# Patient Record
Sex: MALE | Race: ASIAN | NOT HISPANIC OR LATINO | URBAN - METROPOLITAN AREA
[De-identification: names, ages, dates, MRNs, and addresses within clinical notes are randomized per-mention and may not be internally consistent; named-entity substitution may affect disease eponyms.]

---

## 2017-01-10 ENCOUNTER — AMBULATORY - HEALTHEAST (OUTPATIENT)
Dept: PULMONOLOGY | Facility: OTHER | Age: 67
End: 2017-01-10

## 2017-01-10 DIAGNOSIS — J18.9 PNEUMONIA: ICD-10-CM

## 2017-01-13 ENCOUNTER — HOME CARE/HOSPICE - HEALTHEAST (OUTPATIENT)
Dept: HOME HEALTH SERVICES | Facility: HOME HEALTH | Age: 67
End: 2017-01-13

## 2017-01-27 ENCOUNTER — OFFICE VISIT - HEALTHEAST (OUTPATIENT)
Dept: CARDIOLOGY | Facility: CLINIC | Age: 67
End: 2017-01-27

## 2017-01-27 DIAGNOSIS — I49.3 ASYMPTOMATIC PVCS: ICD-10-CM

## 2017-01-27 DIAGNOSIS — I51.7 ENLARGED RV (RIGHT VENTRICLE): ICD-10-CM

## 2017-01-27 DIAGNOSIS — J44.9 COPD (CHRONIC OBSTRUCTIVE PULMONARY DISEASE) (H): ICD-10-CM

## 2017-01-27 ASSESSMENT — MIFFLIN-ST. JEOR: SCORE: 1448.46

## 2017-01-30 ENCOUNTER — HOSPITAL ENCOUNTER (OUTPATIENT)
Dept: CARDIOLOGY | Facility: CLINIC | Age: 67
Discharge: HOME | End: 2017-01-30
Attending: INTERNAL MEDICINE

## 2017-01-30 DIAGNOSIS — I49.3 ASYMPTOMATIC PVCS: ICD-10-CM

## 2017-01-30 DIAGNOSIS — I51.7 ENLARGED RV (RIGHT VENTRICLE): ICD-10-CM

## 2017-02-03 ENCOUNTER — HOME CARE/HOSPICE - HEALTHEAST (OUTPATIENT)
Dept: HOME HEALTH SERVICES | Facility: HOME HEALTH | Age: 67
End: 2017-02-03

## 2017-02-07 ENCOUNTER — HOME CARE/HOSPICE - HEALTHEAST (OUTPATIENT)
Dept: HOME HEALTH SERVICES | Facility: HOME HEALTH | Age: 67
End: 2017-02-07

## 2017-02-09 ENCOUNTER — HOSPITAL ENCOUNTER (OUTPATIENT)
Dept: CARDIOLOGY | Facility: CLINIC | Age: 67
Discharge: HOME | End: 2017-02-09
Attending: INTERNAL MEDICINE

## 2017-02-09 DIAGNOSIS — I49.3 ASYMPTOMATIC PVCS: ICD-10-CM

## 2017-02-09 DIAGNOSIS — I51.7 ENLARGED RV (RIGHT VENTRICLE): ICD-10-CM

## 2017-02-09 LAB
AORTIC ROOT: 3.6 CM
AORTIC ROOT: 3.6 CM
BSA FOR ECHO PROCEDURE: 1.71 M2
CV BLOOD PRESSURE: NORMAL MMHG
CV ECHO HEIGHT: 63 IN
CV ECHO WEIGHT: 146 LBS
ECHO EJECTION FRACTION ESTIMATED: 60 %
FRACTIONAL SHORTENING: 48.7 % (ref 28–44)
INTERVENTRICULAR SEPTUM IN END DIASTOLE: 1.16 CM (ref 0.6–1)
IVS/PW RATIO: 1
LA AREA 1: 13.1 CM2
LEFT ATRIUM LENGTH: 5.01 CM
LEFT VENTRICLE HEART RATE: 88 BPM
LEFT VENTRICLE MASS INDEX: 123.5 G/M2
LEFT VENTRICULAR INTERNAL DIMENSION IN DIASTOLE: 4.87 CM (ref 4.2–5.8)
LEFT VENTRICULAR INTERNAL DIMENSION IN SYSTOLE: 2.5 CM (ref 2.5–4)
LEFT VENTRICULAR MASS: 211.2 G
LEFT VENTRICULAR POSTERIOR WALL IN END DIASTOLE: 1.14 CM (ref 0.6–1)
NUC REST DIASTOLIC VOLUME INDEX: 2336 LBS
NUC REST SYSTOLIC VOLUME INDEX: 63 IN
TRICUSPID VALVE ANULAR PLANE SYSTOLIC EXCURSION: 1.9 CM
TRICUSPID VALVE PEAK REGURGITANT VELOCITY: 221 CM/S
TRICUSPID VALVE PEAK REGURGITANT VELOCITY: 238 CM/S
TRICUSPID VALVE PEAK REGURGITANT VELOCITY: 251 CM/S

## 2017-02-09 ASSESSMENT — MIFFLIN-ST. JEOR: SCORE: 1312.38

## 2017-02-10 ENCOUNTER — HOME CARE/HOSPICE - HEALTHEAST (OUTPATIENT)
Dept: HOME HEALTH SERVICES | Facility: HOME HEALTH | Age: 67
End: 2017-02-10

## 2017-02-13 ENCOUNTER — OFFICE VISIT - HEALTHEAST (OUTPATIENT)
Dept: CARDIOLOGY | Facility: CLINIC | Age: 67
End: 2017-02-13

## 2017-02-13 ENCOUNTER — AMBULATORY - HEALTHEAST (OUTPATIENT)
Dept: CARDIOLOGY | Facility: CLINIC | Age: 67
End: 2017-02-13

## 2017-02-13 DIAGNOSIS — I51.89 RIGHT VENTRICULAR SYSTOLIC DYSFUNCTION: ICD-10-CM

## 2017-02-13 ASSESSMENT — MIFFLIN-ST. JEOR: SCORE: 1380.42

## 2017-02-14 ENCOUNTER — HOME CARE/HOSPICE - HEALTHEAST (OUTPATIENT)
Dept: HOME HEALTH SERVICES | Facility: HOME HEALTH | Age: 67
End: 2017-02-14

## 2017-02-17 ENCOUNTER — HOME CARE/HOSPICE - HEALTHEAST (OUTPATIENT)
Dept: HOME HEALTH SERVICES | Facility: HOME HEALTH | Age: 67
End: 2017-02-17

## 2017-02-20 ENCOUNTER — HOME CARE/HOSPICE - HEALTHEAST (OUTPATIENT)
Dept: HOME HEALTH SERVICES | Facility: HOME HEALTH | Age: 67
End: 2017-02-20

## 2017-02-22 ENCOUNTER — HOME CARE/HOSPICE - HEALTHEAST (OUTPATIENT)
Dept: HOME HEALTH SERVICES | Facility: HOME HEALTH | Age: 67
End: 2017-02-22

## 2017-02-23 ENCOUNTER — HOME CARE/HOSPICE - HEALTHEAST (OUTPATIENT)
Dept: HOME HEALTH SERVICES | Facility: HOME HEALTH | Age: 67
End: 2017-02-23

## 2017-02-25 ENCOUNTER — HOME CARE/HOSPICE - HEALTHEAST (OUTPATIENT)
Dept: HOME HEALTH SERVICES | Facility: HOME HEALTH | Age: 67
End: 2017-02-25

## 2017-02-27 ENCOUNTER — HOSPITAL ENCOUNTER (OUTPATIENT)
Dept: RADIOLOGY | Facility: HOSPITAL | Age: 67
Discharge: HOME | End: 2017-02-27
Attending: INTERNAL MEDICINE

## 2017-02-27 ENCOUNTER — COMMUNICATION - HEALTHEAST (OUTPATIENT)
Dept: PHYSICAL MEDICINE AND REHAB | Facility: CLINIC | Age: 67
End: 2017-02-27

## 2017-02-27 ENCOUNTER — OFFICE VISIT - HEALTHEAST (OUTPATIENT)
Dept: PULMONOLOGY | Facility: OTHER | Age: 67
End: 2017-02-27

## 2017-02-27 DIAGNOSIS — G47.30 SLEEP-DISORDERED BREATHING: ICD-10-CM

## 2017-02-27 DIAGNOSIS — R06.09 DYSPNEA ON EXERTION: ICD-10-CM

## 2017-02-27 DIAGNOSIS — J18.9 PNEUMONIA: ICD-10-CM

## 2017-02-27 DIAGNOSIS — J96.11 CHRONIC RESPIRATORY FAILURE WITH HYPOXIA (H): ICD-10-CM

## 2017-02-27 DIAGNOSIS — J44.89 CHRONIC AIRWAY OBSTRUCTION, NOT ELSEWHERE CLASSIFIED: ICD-10-CM

## 2017-02-28 ENCOUNTER — HOME CARE/HOSPICE - HEALTHEAST (OUTPATIENT)
Dept: HOME HEALTH SERVICES | Facility: HOME HEALTH | Age: 67
End: 2017-02-28

## 2017-03-01 ENCOUNTER — HOME CARE/HOSPICE - HEALTHEAST (OUTPATIENT)
Dept: HOME HEALTH SERVICES | Facility: HOME HEALTH | Age: 67
End: 2017-03-01

## 2017-03-02 ENCOUNTER — RECORDS - HEALTHEAST (OUTPATIENT)
Dept: ADMINISTRATIVE | Facility: OTHER | Age: 67
End: 2017-03-02

## 2017-03-02 ENCOUNTER — HOME CARE/HOSPICE - HEALTHEAST (OUTPATIENT)
Dept: HOME HEALTH SERVICES | Facility: HOME HEALTH | Age: 67
End: 2017-03-02

## 2017-03-03 ENCOUNTER — HOME CARE/HOSPICE - HEALTHEAST (OUTPATIENT)
Dept: HOME HEALTH SERVICES | Facility: HOME HEALTH | Age: 67
End: 2017-03-03

## 2017-03-06 ENCOUNTER — HOME CARE/HOSPICE - HEALTHEAST (OUTPATIENT)
Dept: HOME HEALTH SERVICES | Facility: HOME HEALTH | Age: 67
End: 2017-03-06

## 2017-03-08 ENCOUNTER — HOME CARE/HOSPICE - HEALTHEAST (OUTPATIENT)
Dept: HOME HEALTH SERVICES | Facility: HOME HEALTH | Age: 67
End: 2017-03-08

## 2017-03-09 ENCOUNTER — AMBULATORY - HEALTHEAST (OUTPATIENT)
Dept: CARDIOLOGY | Facility: CLINIC | Age: 67
End: 2017-03-09

## 2017-03-09 ENCOUNTER — OFFICE VISIT - HEALTHEAST (OUTPATIENT)
Dept: CARDIOLOGY | Facility: CLINIC | Age: 67
End: 2017-03-09

## 2017-03-09 ENCOUNTER — HOSPITAL ENCOUNTER (OUTPATIENT)
Dept: PHYSICAL MEDICINE AND REHAB | Facility: CLINIC | Age: 67
Discharge: HOME | End: 2017-03-09
Attending: PHYSICIAN ASSISTANT

## 2017-03-09 DIAGNOSIS — J44.9 COPD (CHRONIC OBSTRUCTIVE PULMONARY DISEASE) (H): ICD-10-CM

## 2017-03-09 DIAGNOSIS — M81.8 STEROID-INDUCED OSTEOPOROSIS: ICD-10-CM

## 2017-03-09 DIAGNOSIS — T38.0X5A STEROID-INDUCED OSTEOPOROSIS: ICD-10-CM

## 2017-03-09 DIAGNOSIS — M54.50 LOW BACK PAIN: ICD-10-CM

## 2017-03-09 DIAGNOSIS — S22.000A THORACIC COMPRESSION FRACTURE (H): ICD-10-CM

## 2017-03-09 DIAGNOSIS — I50.810 RIGHT HEART FAILURE (H): ICD-10-CM

## 2017-03-09 ASSESSMENT — MIFFLIN-ST. JEOR
SCORE: 1362.28
SCORE: 1362.28

## 2017-03-13 ENCOUNTER — HOME CARE/HOSPICE - HEALTHEAST (OUTPATIENT)
Dept: HOME HEALTH SERVICES | Facility: HOME HEALTH | Age: 67
End: 2017-03-13

## 2017-03-15 ENCOUNTER — HOSPITAL ENCOUNTER (OUTPATIENT)
Dept: MRI IMAGING | Facility: CLINIC | Age: 67
Discharge: HOME | End: 2017-03-15
Attending: PHYSICIAN ASSISTANT

## 2017-03-15 DIAGNOSIS — S22.000A THORACIC COMPRESSION FRACTURE (H): ICD-10-CM

## 2017-03-15 DIAGNOSIS — M54.50 LOW BACK PAIN: ICD-10-CM

## 2017-03-16 ENCOUNTER — HOME CARE/HOSPICE - HEALTHEAST (OUTPATIENT)
Dept: HOME HEALTH SERVICES | Facility: HOME HEALTH | Age: 67
End: 2017-03-16

## 2017-03-20 ENCOUNTER — HOSPITAL ENCOUNTER (OUTPATIENT)
Dept: PHYSICAL MEDICINE AND REHAB | Facility: CLINIC | Age: 67
Discharge: HOME | End: 2017-03-20
Attending: PHYSICIAN ASSISTANT

## 2017-03-20 DIAGNOSIS — T38.0X5A STEROID-INDUCED OSTEOPOROSIS: ICD-10-CM

## 2017-03-20 DIAGNOSIS — M54.50 LOW BACK PAIN: ICD-10-CM

## 2017-03-20 DIAGNOSIS — S22.000A THORACIC COMPRESSION FRACTURE (H): ICD-10-CM

## 2017-03-20 DIAGNOSIS — M81.8 STEROID-INDUCED OSTEOPOROSIS: ICD-10-CM

## 2017-03-21 ENCOUNTER — AMBULATORY - HEALTHEAST (OUTPATIENT)
Dept: NEUROSURGERY | Facility: CLINIC | Age: 67
End: 2017-03-21

## 2017-03-21 DIAGNOSIS — M54.9 BACK PAIN: ICD-10-CM

## 2017-03-29 ENCOUNTER — AMBULATORY - HEALTHEAST (OUTPATIENT)
Dept: NEUROSURGERY | Facility: CLINIC | Age: 67
End: 2017-03-29

## 2017-03-29 ENCOUNTER — OFFICE VISIT - HEALTHEAST (OUTPATIENT)
Dept: NEUROSURGERY | Facility: CLINIC | Age: 67
End: 2017-03-29

## 2017-03-29 DIAGNOSIS — M53.3 SACROILIAC JOINT DYSFUNCTION OF BOTH SIDES: ICD-10-CM

## 2017-03-29 DIAGNOSIS — E55.9 VITAMIN D DEFICIENCY: ICD-10-CM

## 2017-03-29 DIAGNOSIS — T38.0X5S STEROID SIDE EFFECTS, SEQUELA: ICD-10-CM

## 2017-03-29 DIAGNOSIS — M48.54XS COMPRESSION FRACTURE OF THORACIC SPINE, NON-TRAUMATIC, SEQUELA: ICD-10-CM

## 2017-03-29 DIAGNOSIS — M81.0 OSTEOPOROSIS: ICD-10-CM

## 2017-03-29 ASSESSMENT — MIFFLIN-ST. JEOR: SCORE: 1338.46

## 2017-04-14 ENCOUNTER — OFFICE VISIT - HEALTHEAST (OUTPATIENT)
Dept: PULMONOLOGY | Facility: OTHER | Age: 67
End: 2017-04-14

## 2017-04-14 DIAGNOSIS — J96.11 CHRONIC RESPIRATORY FAILURE WITH HYPOXIA (H): ICD-10-CM

## 2017-04-14 DIAGNOSIS — R06.09 DYSPNEA ON EXERTION: ICD-10-CM

## 2017-04-14 DIAGNOSIS — J41.0 SIMPLE CHRONIC BRONCHITIS (H): ICD-10-CM

## 2017-07-24 ENCOUNTER — COMMUNICATION - HEALTHEAST (OUTPATIENT)
Dept: PULMONOLOGY | Facility: OTHER | Age: 67
End: 2017-07-24

## 2017-07-24 DIAGNOSIS — K21.9 GERD (GASTROESOPHAGEAL REFLUX DISEASE): ICD-10-CM

## 2018-01-01 ENCOUNTER — HOME CARE/HOSPICE - HEALTHEAST (OUTPATIENT)
Dept: HOSPICE | Facility: HOSPICE | Age: 68
End: 2018-01-01

## 2018-01-01 ENCOUNTER — RECORDS - HEALTHEAST (OUTPATIENT)
Dept: INTENSIVE CARE | Facility: HOSPITAL | Age: 68
End: 2018-01-01

## 2018-01-01 ENCOUNTER — COMMUNICATION - HEALTHEAST (OUTPATIENT)
Dept: ADMINISTRATIVE | Facility: CLINIC | Age: 68
End: 2018-01-01

## 2018-01-01 ENCOUNTER — COMMUNICATION - HEALTHEAST (OUTPATIENT)
Dept: PULMONOLOGY | Facility: OTHER | Age: 68
End: 2018-01-01

## 2018-01-01 ENCOUNTER — RECORDS - HEALTHEAST (OUTPATIENT)
Dept: ADMINISTRATIVE | Facility: OTHER | Age: 68
End: 2018-01-01

## 2018-01-01 ENCOUNTER — ANESTHESIA - HEALTHEAST (OUTPATIENT)
Dept: INTENSIVE CARE | Facility: HOSPITAL | Age: 68
End: 2018-01-01

## 2018-01-01 ENCOUNTER — OFFICE VISIT - HEALTHEAST (OUTPATIENT)
Dept: SURGERY | Facility: CLINIC | Age: 68
End: 2018-01-01

## 2018-01-01 ENCOUNTER — SURGERY - HEALTHEAST (OUTPATIENT)
Dept: SURGERY | Facility: CLINIC | Age: 68
End: 2018-01-01

## 2018-01-01 ENCOUNTER — HOSPITAL ENCOUNTER (OUTPATIENT)
Dept: CT IMAGING | Facility: CLINIC | Age: 68
Discharge: HOME | End: 2018-02-20

## 2018-01-01 ENCOUNTER — AMBULATORY - HEALTHEAST (OUTPATIENT)
Dept: SURGERY | Facility: CLINIC | Age: 68
End: 2018-01-01

## 2018-01-01 ENCOUNTER — ANESTHESIA - HEALTHEAST (OUTPATIENT)
Dept: SURGERY | Facility: CLINIC | Age: 68
End: 2018-01-01

## 2018-01-01 ENCOUNTER — AMBULATORY - HEALTHEAST (OUTPATIENT)
Dept: HOSPICE | Facility: HOSPICE | Age: 68
End: 2018-01-01

## 2018-01-01 ENCOUNTER — HOME CARE/HOSPICE - HEALTHEAST (OUTPATIENT)
Dept: HOME HEALTH SERVICES | Facility: HOME HEALTH | Age: 68
End: 2018-01-01

## 2018-01-01 ENCOUNTER — AMBULATORY - HEALTHEAST (OUTPATIENT)
Dept: PULMONOLOGY | Facility: OTHER | Age: 68
End: 2018-01-01

## 2018-01-01 DIAGNOSIS — J44.9 COPD (CHRONIC OBSTRUCTIVE PULMONARY DISEASE) (H): ICD-10-CM

## 2018-01-01 DIAGNOSIS — Z90.49 S/P CHOLECYSTECTOMY: ICD-10-CM

## 2018-01-01 DIAGNOSIS — R10.9 ABDOMINAL PAIN: ICD-10-CM

## 2018-01-01 DIAGNOSIS — J41.0 SIMPLE CHRONIC BRONCHITIS (H): ICD-10-CM

## 2018-01-01 DIAGNOSIS — J43.9 PULMONARY EMPHYSEMA, UNSPECIFIED EMPHYSEMA TYPE (H): ICD-10-CM

## 2018-01-01 DIAGNOSIS — K81.9 CHOLECYSTITIS: ICD-10-CM

## 2018-01-01 RX ORDER — MORPHINE SULFATE 100 MG/5ML
10-20 SOLUTION ORAL EVERY 4 HOURS
Status: SHIPPED | COMMUNITY
Start: 2018-01-01

## 2018-01-01 RX ORDER — BISACODYL 10 MG
10 SUPPOSITORY, RECTAL RECTAL DAILY PRN
Status: SHIPPED | COMMUNITY
Start: 2018-01-01

## 2018-01-01 RX ORDER — LORAZEPAM 2 MG/ML
1 CONCENTRATE ORAL EVERY 4 HOURS PRN
Status: SHIPPED | COMMUNITY
Start: 2018-01-01

## 2018-01-01 ASSESSMENT — MIFFLIN-ST. JEOR
SCORE: 1321.45
SCORE: 1407.64
SCORE: 1323.27
SCORE: 1308.3
SCORE: 1310.11
SCORE: 1310.11
SCORE: 1308.3
SCORE: 1323.27
SCORE: 1407.64

## 2018-12-07 ENCOUNTER — HOME CARE/HOSPICE - HEALTHEAST (OUTPATIENT)
Dept: HOSPICE | Facility: HOSPICE | Age: 68
End: 2018-12-07

## 2021-05-30 VITALS — BODY MASS INDEX: 27.81 KG/M2 | WEIGHT: 157 LBS

## 2021-05-30 VITALS — BODY MASS INDEX: 28.89 KG/M2 | WEIGHT: 157 LBS | HEIGHT: 62 IN

## 2021-05-30 VITALS — BODY MASS INDEX: 27.82 KG/M2 | HEIGHT: 63 IN | WEIGHT: 157 LBS

## 2021-05-30 VITALS — WEIGHT: 159.2 LBS | BODY MASS INDEX: 29.59 KG/M2

## 2021-05-30 VITALS — WEIGHT: 161 LBS | BODY MASS INDEX: 28.53 KG/M2 | HEIGHT: 63 IN

## 2021-05-30 VITALS — BODY MASS INDEX: 27.28 KG/M2 | WEIGHT: 154 LBS

## 2021-05-30 VITALS — BODY MASS INDEX: 28.75 KG/M2 | WEIGHT: 162.3 LBS

## 2021-05-30 VITALS — HEIGHT: 63 IN | WEIGHT: 146 LBS | BODY MASS INDEX: 25.87 KG/M2

## 2021-05-30 VITALS — BODY MASS INDEX: 28.16 KG/M2 | HEIGHT: 65 IN | WEIGHT: 169 LBS

## 2021-05-30 VITALS — WEIGHT: 156.2 LBS | BODY MASS INDEX: 27.67 KG/M2

## 2021-06-01 VITALS — BODY MASS INDEX: 26.22 KG/M2 | HEIGHT: 63 IN | WEIGHT: 148 LBS

## 2021-06-01 VITALS
WEIGHT: 148.4 LBS | BODY MASS INDEX: 26.29 KG/M2 | HEIGHT: 63 IN | WEIGHT: 148.4 LBS | HEIGHT: 63 IN | BODY MASS INDEX: 26.29 KG/M2

## 2021-06-02 ENCOUNTER — RECORDS - HEALTHEAST (OUTPATIENT)
Dept: ADMINISTRATIVE | Facility: CLINIC | Age: 71
End: 2021-06-02

## 2021-06-08 NOTE — PROGRESS NOTES
"[facility] Heart Care Note    Assessment/Plan:    Qing Lira is a 66 y.o. old male with:  1. RV dysfunction in setting of pneumonia and COPD - no signs of pulmonary HTN or right heart failure other than mild edema  - will repeat echo and follow up with heart failure NP - if RV is still depressed, would arrange follow up with cardiologist who specializes in heart failure  2. Possible arrythmia - could not find tests to confirm, ECG NSR and no PVC, will schedule 24 hour holter to screen for PVC/nonsustained VT -         Dr. Titi Mesa  [facility] HEART CARE  [phone]  ______________________________________________________________________    Subjective:    I had the opportunity to see Qing Lira at the [facility]. Qing Lira is a 66 y.o. male hmong speaking only with a known history of severe COPD, oxygen dependent, with RV dysfunction and normal LV function, TR estimate of PA pressure 23mmHg + RAP,  here with complaint of dyspnea.   Dyspnea since 2013, recent admission for COPD exhaserbatin and pneumonia and was told to follow up to \"take a look at the heart\".  There is mention of nonsustained VT during hospital stay noted, but family unaware.  He does not have a hx of MI, no syncopal events, no CVA, no family hx of sudden death, no hx of  DM, HTN or hyperlipidemia.   Pt reports increase dyspnea, he is unable to ambulate far due to dyspnea.   He has had some edema in the past, using furosemide.  Tob in the past, but stopped when he was sick ~1 year ago.  No  symptoms of right heart failure other than mild edema.      ______________________________________________________________________      Review of Systems:   General: Night Sweats  Eyes: WNL  Ears/Nose/Throat: WNL  Lungs: Cough, Shortness of Breath, Wheezing  Heart: Shortness of Breath with activity, Leg Swelling  Stomach: WNL  Bladder: Frequent Urination at Night  Muscle/Joints: WNL  Skin: WNL  Nervous System: Daytime " Sleepiness, Dizziness  Mental Health: WNL     Blood: WNL    Problem List:  Patient Active Problem List   Diagnosis     Cough     Abdominal Pain     Chronic Obstructive Pulmonary Disease     Tobacco abuse     Chest pain     Hypoxia     Constipation     Hyperglycemia     COPD (chronic obstructive pulmonary disease)     Back pain     Compression fracture of lumbar vertebra     COPD exacerbation     Acute respiratory failure     Diastolic dysfunction     Pneumonia     Medical History:  Past Medical History   Diagnosis Date     Asthma      COPD (chronic obstructive pulmonary disease)      Headache      Hyperglycemia      Hypertension      Pneumonia      Surgical History:  Past Surgical History   Procedure Laterality Date     No past surgeries       Picc and midline team line insertion  1/7/2017           Social History:  No pertinent social hx related to patient's chief complaint other than above in subjective        Family History:  No pertinent family hx related to patient's chief complaint other than above in subjective      Allergies:  No Known Allergies    Medications:  Current Outpatient Prescriptions   Medication Sig Dispense Refill     acetaminophen (MAPAP, ACETAMINOPHEN,) 500 mg coapsule Take 500 mg by mouth every 6 (six) hours as needed.        albuterol (PROVENTIL HFA;VENTOLIN HFA) 90 mcg/actuation inhaler Inhale 1-2 puffs every 4 (four) hours as needed.        ascorbic acid (ASCORBIC ACID WITH RUBIO HIPS) 500 MG tablet Take 1 tablet (500 mg total) by mouth daily. 90 tablet 1     budesonide-formoterol (SYMBICORT) 80-4.5 mcg/actuation inhaler Inhale 2 puffs 2 (two) times a day. 1 Inhaler 11     furosemide (LASIX) 20 MG tablet Take 1 tablet (20 mg total) by mouth 2 (two) times a day at 9am and 6pm. 60 tablet 0     hydrocortisone (CORTEF) 5 MG tablet Take 5 mg by mouth daily with supper. Take 2 tab in the am and 1 tab in the pm        hydrocortisone (CORTEF) 5 MG tablet Take 10 mg by mouth every morning.        "hydrocortisone 1 % cream Apply 1 application topically 2 (two) times a day as needed (itching).       ipratropium-albuterol (COMBIVENT RESPIMAT)  mcg/actuation Mist inhaler Inhale 1 puff 4 (four) times a day as needed.        ipratropium-albuterol (DUO-NEB) 0.5-2.5 mg/3 mL nebulizer Use qid for 5 day then 2-3 times a day as needed 75 mL 11     loratadine (CLARITIN) 10 mg tablet Take 10 mg by mouth daily as needed for allergies.        magnesium oxide 400 mg cap Take 1 tablet by mouth daily.       NEBULIZER ACCESSORIES (NEBULIZER MISC) Use as directed.       omeprazole (PRILOSEC) 20 MG capsule Take 1 capsule (20 mg total) by mouth daily. 30 capsule 11     psyllium (FIBER LAXATIVE, PSYLLIUM HUSK,) 0.52 gram capsule Take 0.52 g by mouth 4 (four) times a day as needed. May take 1-4 tabs daily as needed        senna-docusate (PERICOLACE) 8.6-50 mg tablet Take 1 tablet by mouth daily as needed for constipation.       No current facility-administered medications for this visit.        Objective:   Vital signs:  Visit Vitals     /88 (Patient Site: Left Arm, Patient Position: Sitting, Cuff Size: Adult Large)     Pulse 96     Resp 18     Ht 5' 5\" (1.651 m)     Wt 169 lb (76.7 kg)     BMI 28.12 kg/m2         Physical Exam:    GENERAL APPEARANCE: Alert, cooperative and in no acute distress.  HEENT: No scleral icterus. No Xanthelasma. Oral mucuos membranes pink and moist.  NECK: JVP<6cm. No Hepatojugular reflux. Thyroid not visualized  CHEST: clear to auscultation  CARDIOVASCULAR: S1, S2 without murmur ,clicks or rubs. Brachial, radial and posterior tibial pulses are intact and symetric. No carotid bruits noted.    ABDOMEN: Nontender. BS+. No bruits.  EXTREMITIES: No cyanosis, clubbing or edema.    Lab Results:  LIPIDS:  No results found for: CHOL  No results found for: HDL  No results found for: LDLCALC  No results found for: TRIG  No components found for: CHOLHDL    BMP:  Lab Results   Component Value Date    " CREATININE 0.73 01/12/2017    BUN 31 (H) 01/12/2017     01/12/2017    K 3.9 01/12/2017    CL 96 (L) 01/12/2017    CO2 38 (H) 01/12/2017       Left ventricle ejection fraction is normal. The calculated left ventricular ejection fraction is 71%.    Right Ventricle: The right ventricle is moderately dilated. Right ventricular end-diastolic volume is moderately increased. Right ventricular end-systolic volume is moderately increased. The systolic function is severely reduced. TAPSE is abnormal, which is consistent with abnormal right ventricular systolic function.    No previous study for comparison.    Titi Mesa MD  [facility] HEART [facility]  [phone]

## 2021-06-08 NOTE — PROGRESS NOTES
Assessment/Plan:     1. Right-sided heart failure: Qing Lira appears well compensated.  We discussed monitoring heart failure symptoms, following a low-sodium diet, monitoring daily weights, and heart failure treatment.  He is on Lasix 20 mg twice a day.  He was very appreciative for the education on low sodium diet and monitoring daily weights to help improve fluid retention.  We discussed the results of his limited echocardiogram.    Follow-up in the heart failure clinic as needed    Subjective:     Qing Lira is seen at [facility] heart failure clinic today for continued follow-up.  His son accompanies him today.  There is an  for the duration of his appointment.  He follows up for right sided heart failure.  He had an echocardiogram in January 7, 2017 which showed an ejection fraction of 71% with his right ventricle moderately dilated.  He saw Dr. Mesa in the heart clinic who recommended a repeat echocardiogram due to the thought his right sided heart failure was due to his COPD.  On February 9, 2017 he had a limited echocardiogram which showed an improvement in his right ventricular function.  He has past medical history significant for hypertension, COPD, asthma, and previous tobacco use.    Today, he comes in with complaints of dyspnea on exertion which has not worsened.  He denies any other acute heart failure symptoms.  He denies fatigue, lightheadedness, shortness of breath, orthopnea, PND, palpitations, chest pain, abdominal fullness/bloating and lower extremity edema.      He is not monitoring home weights.  He is not following a low sodium diet.      Review of Systems:   General: WNL  Eyes: WNL  Ears/Nose/Throat: WNL  Lungs: Cough, Shortness of Breath  Heart: Shortness of Breath with activity  Stomach: WNL  Bladder: WNL  Muscle/Joints: WNL  Skin: WNL  Nervous System: WNL  Mental Health: WNL     Blood: WNL     Patient Active Problem List   Diagnosis     Cough     Abdominal  Pain     Chronic Obstructive Pulmonary Disease     Tobacco abuse     Chest pain     Hypoxia     Constipation     Hyperglycemia     COPD (chronic obstructive pulmonary disease)     Back pain     Compression fracture of lumbar vertebra     COPD exacerbation     Acute respiratory failure     Pneumonia     Narcotic overdose, accidental or unintentional, subsequent encounter     Acute hypoxemic respiratory failure     Right ventricular systolic dysfunction     Delirium       Past Medical History:   Diagnosis Date     Asthma      COPD (chronic obstructive pulmonary disease)     oxygen dep 3 liters     Headache      Hyperglycemia      Hypertension      Pneumonia      Secondary adrenal insufficiency        Past Surgical History:   Procedure Laterality Date     INSERT MIDLINE HE  2/1/2017          NO PAST SURGERIES       PICC AND MIDLINE TEAM LINE INSERTION  1/7/2017            Family History   Problem Relation Age of Onset     Cancer Mother      Ovarian     No Medical Problems Father      Clotting disorder Neg Hx        Social History     Social History     Marital status:      Spouse name: N/A     Number of children: N/A     Years of education: N/A     Occupational History     Not on file.     Social History Main Topics     Smoking status: Former Smoker     Packs/day: 1.00     Years: 30.00     Quit date: 3/1/2015     Smokeless tobacco: Never Used     Alcohol use No      Comment: Smoking cessation packet provided     Drug use: No     Sexual activity: Not on file     Other Topics Concern     Not on file     Social History Narrative       Current Outpatient Prescriptions   Medication Sig Dispense Refill     acetaminophen (MAPAP, ACETAMINOPHEN,) 500 mg coapsule Take 500 mg by mouth every 6 (six) hours. 30 capsule 0     albuterol (PROVENTIL HFA;VENTOLIN HFA) 90 mcg/actuation inhaler Inhale 1-2 puffs every 4 (four) hours as needed.        albuterol (PROVENTIL) 2.5 mg /3 mL (0.083 %) nebulizer solution Take 3 mL (2.5 mg  total) by nebulization every 4 (four) hours as needed for shortness of breath. 75 mL 0     ascorbic acid (ASCORBIC ACID WITH RUBIO HIPS) 500 MG tablet Take 1 tablet (500 mg total) by mouth daily. 90 tablet 1     budesonide-formoterol (SYMBICORT) 80-4.5 mcg/actuation inhaler Inhale 2 puffs 2 (two) times a day. 1 Inhaler 11     calcitonin, salmon, (MIACALCIN) 200 unit/actuation nasal spray 1 spray into each nostril daily. 1.85 mL 0     furosemide (LASIX) 20 MG tablet Take 1 tablet (20 mg total) by mouth 2 (two) times a day at 9am and 6pm. 60 tablet 0     HELIUM-OXYGEN INHL 3 L into each nostril continuous. Indications: SOB       hydrocortisone (CORTEF) 5 MG tablet Take 1 tablet (5 mg total) by mouth daily with supper. Start on 2/9 Take 2 tab in the am and 1 tab in the pm  0     hydrocortisone 1 % cream Apply 1 application topically 2 (two) times a day as needed (itching).       ipratropium-albuterol (COMBIVENT RESPIMAT)  mcg/actuation Mist inhaler Inhale 1 puff 4 (four) times a day as needed.        ipratropium-albuterol (DUO-NEB) 0.5-2.5 mg/3 mL nebulizer Use qid for 5 day then 2-3 times a day as needed (Patient taking differently: Inhale 3 mL every 8 (eight) hours as needed. ) 75 mL 11     lidocaine (LIDODERM) 5 % Remove & Discard patch within 12 hours or as directed by MD 30 patch 0     loratadine (CLARITIN) 10 mg tablet Take 10 mg by mouth daily as needed for allergies.        magnesium oxide 400 mg cap Take 1 tablet by mouth daily.       naproxen sodium (ALEVE) 220 MG tablet Take 1 tablet (220 mg total) by mouth 2 (two) times a day with meals.  0     NEBULIZER ACCESSORIES (NEBULIZER MISC) Use as directed.       omeprazole (PRILOSEC) 20 MG capsule Take 1 capsule (20 mg total) by mouth daily. 30 capsule 11     predniSONE (DELTASONE) 10 MG tablet Take as directed       psyllium (FIBER LAXATIVE, PSYLLIUM HUSK,) 0.52 gram capsule Take 0.52 g by mouth 4 (four) times a day as needed. May take 1-4 tabs daily as  needed        senna-docusate (PERICOLACE) 8.6-50 mg tablet Take 1 tablet by mouth daily as needed for constipation.       hydrocortisone (CORTEF) 5 MG tablet Take 2 tablets (10 mg total) by mouth every morning. Resume on 2/9  0     No current facility-administered medications for this visit.        No Known Allergies    Objective:     Vitals:    02/13/17 1013   BP: 126/82   Pulse: (!) 104   Resp: 20     Wt Readings from Last 3 Encounters:   02/13/17 161 lb (73 kg)   02/09/17 146 lb (66.2 kg)   02/06/17 146 lb 9.6 oz (66.5 kg)       General Appearance:   Alert, cooperative and in no acute distress.   HEENT:  No scleral icterus; the mucous membranes were pink and moist.   Neck: JVP normal. No HJR.   Chest: The spine was straight. The chest was symmetric.   Lungs:   Respirations unlabored; the lungs are decreased to auscultation.   Cardiovascular:   Regular rhythm. S1 and S2 without murmur, clicks or rubs. Radial and posterior tibial pulses are intact and symmetrical.    Abdomen:  Soft, nontender, nondistended, bowel sounds present   Extremities: No cyanosis, clubbing. 1+ bilateral lower extremity edema.   Skin: No xanthelasma.   Neurologic: Mood and affect are appropriate.         Lab Review   Lab Results   Component Value Date    CREATININE 0.72 02/05/2017    BUN 27 (H) 02/05/2017     02/05/2017    K 4.9 02/06/2017     02/05/2017    CO2 21 (L) 02/05/2017     Lab Results   Component Value Date     (H) 02/03/2017     BNP (pg/mL)   Date Value   02/03/2017 106 (H)   02/01/2017 163 (H)   01/07/2017 362 (H)     CREATININE (mg/dL)   Date Value   02/05/2017 0.72   02/03/2017 0.67 (L)   02/02/2017 0.65 (L)   02/01/2017 0.84       Cardiographics  Echocardiogram: 2/9/2017  Summary     No hemodynamically significant valvular heart abnormalities.    When compared to the previous study dated 1/7/2017, the right ventricular function has improved    Left ventricle ejection fraction is normal. The estimated left  ventricular ejection fraction is 60%.    Mild right ventricular enlargement           25 minutes were spent face to face with the patient with greater than 50% spent on education and counseling.      Vivien Keane, [facility]   Heart Failure Clinic

## 2021-06-09 NOTE — PROGRESS NOTES
[facility] Heart Care Clinic Progress Note    Assessment:    1.  Right heart failure improved  2.  Severe COPD clinically stable        Plan:    Would change his furosemide to 40 mg once a day and continue all of his other medicines as he is currently taking.  I have not arranged any specific follow-up or further cardiac testing for Mr. Lira at this time    An After Visit Summary was printed and given to the patient.    Subjective:    66-year-old male who has known right ventricular dysfunction secondary to severe underlying COPD.  He had an echocardiogram on January 7, 2017 that revealed normal left ventricular systolic function with no significant valvular abnormalities.  He had right ventricular enlargement present with severe right ventricular hypokinesis present.  The patient was subsequently hospitalized from February 1 2 February 6 for a COPD exacerbation and altered mental status felt to be due to narcotic overdose.  Echocardiogram on February 9, 2017 revealed improved in his right ventricular size and function.  Over the last several weeks his breathing has gradually improved he reports only a trace to mild pedal edema that has been stable    Problem List:    Patient Active Problem List   Diagnosis     Cough     Abdominal Pain     Chronic Obstructive Pulmonary Disease     Tobacco abuse     Chest pain     Constipation     Hyperglycemia     COPD (chronic obstructive pulmonary disease)     Back pain     Compression fracture of lumbar vertebra     Narcotic overdose, accidental or unintentional, subsequent encounter     Right ventricular systolic dysfunction     Delirium         Current Outpatient Prescriptions   Medication Sig Dispense Refill     acetaminophen (MAPAP, ACETAMINOPHEN,) 500 mg coapsule Take 500 mg by mouth every 6 (six) hours. 30 capsule 0     albuterol (PROVENTIL HFA;VENTOLIN HFA) 90 mcg/actuation inhaler Inhale 1-2 puffs every 4 (four) hours as needed. 1 Inhaler 11     albuterol (PROVENTIL) 2.5  mg /3 mL (0.083 %) nebulizer solution Take 3 mL (2.5 mg total) by nebulization every 4 (four) hours as needed for shortness of breath. 75 mL 0     ascorbic acid (ASCORBIC ACID WITH RUBIO HIPS) 500 MG tablet Take 1 tablet (500 mg total) by mouth daily. 90 tablet 1     budesonide-formoterol (SYMBICORT) 80-4.5 mcg/actuation inhaler Inhale 2 puffs 2 (two) times a day. 1 Inhaler 11     calcitonin, salmon, (MIACALCIN) 200 unit/actuation nasal spray 1 spray into each nostril daily. 1.85 mL 0     HELIUM-OXYGEN INHL 3 L into each nostril continuous. Indications: SOB       hydrocortisone (CORTEF) 5 MG tablet Take 1 tablet (5 mg total) by mouth daily with supper. Start on 2/9 Take 2 tab in the am and 1 tab in the pm  0     hydrocortisone 1 % cream Apply 1 application topically 2 (two) times a day as needed (itching).       ipratropium-albuterol (COMBIVENT RESPIMAT)  mcg/actuation Mist inhaler Inhale 1 puff 4 (four) times a day as needed. 1 Inhaler 11     ipratropium-albuterol (DUO-NEB) 0.5-2.5 mg/3 mL nebulizer Use qid for 5 day then 2-3 times a day as needed (Patient taking differently: Inhale 3 mL every 8 (eight) hours as needed. ) 75 mL 11     loratadine (CLARITIN) 10 mg tablet Take 10 mg by mouth daily as needed for allergies.        naproxen sodium (ALEVE) 220 MG tablet Take 1 tablet (220 mg total) by mouth 2 (two) times a day with meals.  0     NEBULIZER ACCESSORIES (NEBULIZER MISC) Use as directed.       omeprazole (PRILOSEC) 20 MG capsule Take 1 capsule (20 mg total) by mouth daily. 30 capsule 11     predniSONE (DELTASONE) 10 MG tablet Take as directed       psyllium (FIBER LAXATIVE, PSYLLIUM HUSK,) 0.52 gram capsule Take 0.52 g by mouth 4 (four) times a day as needed. May take 1-4 tabs daily as needed        senna-docusate (PERICOLACE) 8.6-50 mg tablet Take 1 tablet by mouth daily as needed for constipation.       furosemide (LASIX) 40 MG tablet Take 1 tablet (40 mg total) by mouth daily. 90 tablet 4     No  "current facility-administered medications for this visit.        .  Past Surgical History:   Procedure Laterality Date     INSERT MIDLINE HE  2/1/2017          NO PAST SURGERIES       PICC AND MIDLINE TEAM LINE INSERTION  1/7/2017            .  Social History     Social History     Marital status:      Spouse name: N/A     Number of children: N/A     Years of education: N/A     Occupational History     Not on file.     Social History Main Topics     Smoking status: Former Smoker     Packs/day: 1.00     Years: 30.00     Quit date: 3/1/2015     Smokeless tobacco: Never Used     Alcohol use No      Comment: Smoking cessation packet provided     Drug use: No     Sexual activity: Not on file     Other Topics Concern     Not on file     Social History Narrative       .  Family History   Problem Relation Age of Onset     Cancer Mother      Ovarian     No Medical Problems Father      Clotting disorder Neg Hx      Review of Systems:  General: Night Sweats  Eyes: WNL  Ears/Nose/Throat: WNL  Lungs: Shortness of Breath  Heart: Shortness of Breath with activity  Stomach: WNL  Bladder: WNL  Muscle/Joints: WNL  Skin: WNL  Nervous System: WNL  Mental Health: WNL     Blood: Easy Bruising    Objective:     Visit Vitals     /76 (Patient Site: Left Arm, Patient Position: Sitting, Cuff Size: Adult Regular)     Pulse 84     Resp 16     Ht 5' 3\" (1.6 m)     Wt 157 lb (71.2 kg)     BMI 27.81 kg/m2     157 lb (71.2 kg)   [unfilled]    Physical Exam:    GENERAL APPEARANCE: alert, no apparent distress  HEENT: no scleral icterus or xanthelasma  NECK: jugular venous pressure normal  CHEST: symmetric, the lungs were clear to auscultation  CARDIOVASCULAR: regular rhythm without murmur, click, or gallop; no carotid bruits  ABDOMEN: nondistended, nontender, bowel sounds present  EXTREMITIES: no cyanosis, clubbing with trace to 1+ bilateral pedal edema    Cardiac Testing:    echocardiogram last performed February 9, 2017 results " reviewed as above      Lab Results:    LIPIDS:  No results found for: CHOL  No results found for: HDL  No results found for: LDLCALC  No results found for: TRIG  No components found for: CHOLHDL    BMP:  Lab Results   Component Value Date    CREATININE 0.72 02/05/2017    BUN 27 (H) 02/05/2017     02/05/2017    K 4.9 02/06/2017     02/05/2017    CO2 21 (L) 02/05/2017         Gio Domingo MD,F.A.C.C.  [facility]  [phone]

## 2021-06-09 NOTE — PROGRESS NOTES
Assessment/Plan:        Diagnoses and all orders for this visit:    Chronic Obstructive Pulmonary Disease  -     albuterol (PROVENTIL HFA;VENTOLIN HFA) 90 mcg/actuation inhaler; Inhale 1-2 puffs every 4 (four) hours as needed.  Dispense: 1 Inhaler; Refill: 11  -     ipratropium-albuterol (COMBIVENT RESPIMAT)  mcg/actuation Mist inhaler; Inhale 1 puff 4 (four) times a day as needed.  Dispense: 1 Inhaler; Refill: 11    Chronic respiratory failure with hypoxia    Dyspnea on exertion    Sleep-disordered breathing      Medication Plan  Symbicort 2 puffs twice daily - rinse your mouth out after using  Albuterol inhaler as needed  Combivent as needed up to 4 times per day    Oxygen Plan  We will track down your oxygen company.  No oxygen at rest.  3 lpm with activity  3 lpm at night    We will check your oxygen at night.    Exercise Plan  5 minutes of gentle walking 3 times daily.    [phone] [facility]        Subjective:    Patient ID: Qing Lira is a 66 y.o. male.    HPI Comments: Back pain - improved    Sleep disordered breathing.  Suspicion in hospital. No previous diagnosis.  He has never had a sleep study before.  No snoring.  Sleep is ok.    COPD - had some bronchitis about 2-3 months ago.    Symbicort 2 puffs 2 times daily  Combivent 2 times daily  Ventolin 2 times daily    Chronic Hypoxic respiratory failure - was prescribed chronic oxygen after hospitalization.  He is using his oxygen less.  He does feel that his oxygen helps make his breathing easier.    Dyspnea: worse with exertion.  Better with rest.  Oxygen does help sometimes.  Symptoms localize to his chest.  Basically stable to 2-3 months ago.  No leg swelling, no hemoptysis.    --- from previous  66M here for follow up of COPD.    He is feeling better since his last visit.  He is more independent in his walking and has less problems with changes in position.    He uses his symbicort twice daily.    He is using his nebulizer variably depending how  he is doing.    The biggest trigger for him is cold weather.    Seen with assistance of .      --- from previous  Here today to follow up COPD now feeling better.  His breathing has improved to nearly normal.  Sometimes he is a little short of breath with more significant exercise but this resolves with rest.  No clear triggers.  No smoking since last April.    Using nebulizer every 1-3 days.  Using symbicort every 1-3 days.    --- from previous  Feeling the same as previous.  Went camping recently so is a little tired.  Not wheezy.  At last visit, reported not taking symbicort.  Using nebulizer 3 times daily and it helps.    ---from previous  65M here for first visit after admission for COPD exacerbation several weeks ago. He is feeling better now.  His symptoms have slowly improved since then.  His primary symptom is dyspnea.  It is worse with exertion.  It is better with rest.  Symptoms localize to chest and throat.  He has no sputum, hemoptysis, wheezing, or other symptoms.  He had never had an episode like this before.  Since then he has stopped smoking.  Per his report, his PMD stopped his symbicort and he has tolerated this well.  He is using nebs 2-3 times daily.        Review of Systems negative except as in HPI for 4 systems: constitutional, neurologic, pulmonary, cardiovascular  Current Outpatient Prescriptions on File Prior to Visit   Medication Sig Dispense Refill     acetaminophen (MAPAP, ACETAMINOPHEN,) 500 mg coapsule Take 500 mg by mouth every 6 (six) hours. 30 capsule 0     albuterol (PROVENTIL HFA;VENTOLIN HFA) 90 mcg/actuation inhaler Inhale 1-2 puffs every 4 (four) hours as needed.        albuterol (PROVENTIL) 2.5 mg /3 mL (0.083 %) nebulizer solution Take 3 mL (2.5 mg total) by nebulization every 4 (four) hours as needed for shortness of breath. 75 mL 0     ascorbic acid (ASCORBIC ACID WITH RUBIO HIPS) 500 MG tablet Take 1 tablet (500 mg total) by mouth daily. 90 tablet 1      budesonide-formoterol (SYMBICORT) 80-4.5 mcg/actuation inhaler Inhale 2 puffs 2 (two) times a day. 1 Inhaler 11     calcitonin, salmon, (MIACALCIN) 200 unit/actuation nasal spray 1 spray into each nostril daily. 1.85 mL 0     furosemide (LASIX) 20 MG tablet Take 1 tablet (20 mg total) by mouth 2 (two) times a day at 9am and 6pm. 60 tablet 0     HELIUM-OXYGEN INHL 3 L into each nostril continuous. Indications: SOB       hydrocortisone (CORTEF) 5 MG tablet Take 1 tablet (5 mg total) by mouth daily with supper. Start on 2/9 Take 2 tab in the am and 1 tab in the pm  0     hydrocortisone 1 % cream Apply 1 application topically 2 (two) times a day as needed (itching).       ipratropium-albuterol (COMBIVENT RESPIMAT)  mcg/actuation Mist inhaler Inhale 1 puff 4 (four) times a day as needed.        ipratropium-albuterol (DUO-NEB) 0.5-2.5 mg/3 mL nebulizer Use qid for 5 day then 2-3 times a day as needed (Patient taking differently: Inhale 3 mL every 8 (eight) hours as needed. ) 75 mL 11     lidocaine (LIDODERM) 5 % Remove & Discard patch within 12 hours or as directed by MD 30 patch 0     loratadine (CLARITIN) 10 mg tablet Take 10 mg by mouth daily as needed for allergies.        magnesium oxide 400 mg cap Take 1 tablet by mouth daily.       naproxen sodium (ALEVE) 220 MG tablet Take 1 tablet (220 mg total) by mouth 2 (two) times a day with meals.  0     NEBULIZER ACCESSORIES (NEBULIZER MISC) Use as directed.       omeprazole (PRILOSEC) 20 MG capsule Take 1 capsule (20 mg total) by mouth daily. 30 capsule 11     predniSONE (DELTASONE) 10 MG tablet Take as directed       psyllium (FIBER LAXATIVE, PSYLLIUM HUSK,) 0.52 gram capsule Take 0.52 g by mouth 4 (four) times a day as needed. May take 1-4 tabs daily as needed        senna-docusate (PERICOLACE) 8.6-50 mg tablet Take 1 tablet by mouth daily as needed for constipation.       [DISCONTINUED] hydrocortisone (CORTEF) 5 MG tablet Take 2 tablets (10 mg total) by mouth  every morning. Resume on 2/9  0     No current facility-administered medications on file prior to visit.      Visit Vitals     /60     Pulse 81     Resp 18     Wt 162 lb 4.8 oz (73.6 kg)     SpO2 96%  Comment: RA-pt on 3L of O2, but only uses when he is at home     BMI 28.75 kg/m2             Objective:    Physical Exam   Constitutional: He is oriented to person, place, and time. He appears well-developed and well-nourished. No distress.   HENT:   Head: Normocephalic.   Mouth/Throat: Oropharynx is clear and moist. No oropharyngeal exudate.   MPIII   Eyes: Pupils are equal, round, and reactive to light. Right eye exhibits no discharge. Left eye exhibits no discharge. No scleral icterus.   Neck: Normal range of motion.   Cardiovascular: Normal rate and regular rhythm.  Exam reveals no gallop and no friction rub.    No murmur heard.  Pulmonary/Chest: Effort normal and breath sounds normal. No respiratory distress. He has no wheezes. He has no rales.   Abdominal: Soft. Bowel sounds are normal. He exhibits no distension. There is no tenderness.   Musculoskeletal: He exhibits edema. He exhibits no deformity.   Neurological: He is alert and oriented to person, place, and time. No cranial nerve deficit.   Skin: Skin is warm and dry. No rash noted. He is not diaphoretic. No erythema. No pallor.   Psychiatric: He has a normal mood and affect. His behavior is normal. Judgment and thought content normal.               Medical History  Active Ambulatory (Non-Hospital) Problems    Diagnosis     Delirium     Narcotic overdose, accidental or unintentional, subsequent encounter     Acute hypoxemic respiratory failure     Right ventricular systolic dysfunction     Pneumonia     COPD exacerbation     Acute respiratory failure     Compression fracture of lumbar vertebra     Back pain     Hyperglycemia     COPD (chronic obstructive pulmonary disease)     Hypoxia     Constipation     Tobacco abuse     Chest pain     Chronic  Obstructive Pulmonary Disease     Cough     Abdominal Pain     Past Medical History:   Diagnosis Date     Asthma      COPD (chronic obstructive pulmonary disease)      Headache      Hyperglycemia      Hypertension      Pneumonia      Secondary adrenal insufficiency          Social history reviewed today  He is here with son.  He is no longer taking the extra medications he was before.  No other substances.   Allergies  No Known Allergies                           Data Review - imaging, labs, and ekgs listed below were reviewed by me.  CXR and CT images and EKG tracings interpreted personally.     Past Labs  Hospital Outpatient Visit on 02/09/2017   Component Date Value     IVSd 02/09/2017 1.16      LVIDd 02/09/2017 4.87      LVIDs 02/09/2017 2.5      LV PWd 02/09/2017 1.14      AO root 02/09/2017 3.6      AO root 02/09/2017 3.6      TR peak emi 02/09/2017 251      TR peak emi 02/09/2017 221      TR peak emi 02/09/2017 238      LA area 1 02/09/2017 13.1      LA length 02/09/2017 5.01      TAPSE 02/09/2017 1.9      TAPSE 02/09/2017 1.9      TAPSE 02/09/2017 1.9      BSA 02/09/2017 1.71      Hieght 02/09/2017 63      Weight 02/09/2017 2336      BP 02/09/2017 128/72      HR 02/09/2017 88      IVS/PW ratio 02/09/2017 1.0      LV FS 02/09/2017 48.7      LV mass 02/09/2017 211.2      LV mass index 02/09/2017 123.5      Height 02/09/2017 63.0      Weight 02/09/2017 146      Echo LVEF Estimated 02/09/2017 60    Admission on 02/01/2017, Discharged on 02/06/2017   No results displayed because visit has over 200 results.      Admission on 01/07/2017, Discharged on 01/13/2017   No results displayed because visit has over 200 results.        Past Imaging  Xr Chest Ap Portable    Result Date: 2/3/2017  XR CHEST AP PORTABLE 2/3/2017 1:15 AM INDICATION: rule out pneumonia COMPARISON: 2/1/2017 FINDINGS: Stable mild enlargement of the cardiac silhouette with mild interstitial prominence, possibly reflecting a component of fluid  overload. No new consolidation. Old fracture of the left posterior sixth rib. No visible pneumothorax. Stable blunting  of the right costophrenic angle which could reflect pleural thickening/scarring or small effusion.    Xr Chest Ap Portable    Result Date: 2/1/2017  XR CHEST AP PORTABLE 2/1/2017 2:26 PM INDICATION: sob COMPARISON: 01/07/2017 FINDINGS: No change. Linear atelectasis or scarring present both upper lobes. Slight blunting of the right costophrenic angle likely chronic. Mild cardiomegaly and mild diffuse interstitial prominence, no new pneumonia.    Ct Head Without Contrast    Result Date: 2/3/2017  [facility] HEAD CT WITHOUT IV CONTRAST 2/3/2017 2:04 PM INDICATION: Confusion, agitation. TECHNIQUE: Head CT without IV contrast. Dose reduction techniques were used. COMPARISON: Head MRI 01/20/2017. FINDINGS: No evidence for acute intracranial hemorrhage, obstructive hydrocephalus, mass lesion or definite acute infarct by CT. No midline shift. Basal cisterns are patent. Mild generalized cerebral and cerebellar atrophy. Chronic lacunar infarct right caudate head region unchanged from prior MRI. Partially empty sella again noted. Trace mucosal thickening left mastoids. This was present on prior MR. Right mastoids clear. Trace mucosal thickening in the posterior maxillary sinuses and ethmoid air cells.     CONCLUSION: 1.  No evidence for acute intracranial hemorrhage, obstructive hydrocephalus, mass lesion or definite acute infarct by CT. 2.  No intracranial mass effect. 3.  Stable mild generalized cerebral and cerebellar atrophy with stable small chronic lacunar infarct right caudate head region.    Cta Chest Pe Run    Result Date: 2/1/2017  CTA CHEST PE RUN 2/1/2017 8:37 PM INDICATION: Chest pain, acute, PE suspected TECHNIQUE: Helical acquisition through the chest was performed during the arterial phase of contrast enhancement using IV contrast. 2D and 3D reconstructions were performed by the CT  technologist. Dose reduction techniques were used. IV CONTRAST: Iohexol (Omni) 100 mL COMPARISON: 1/7/2017. FINDINGS: ANGIOGRAM CHEST: Negative for pulmonary emboli. Negative for thoracic aortic dissection and aneurysms. LUNGS AND PLEURA: Again noted is right perihilar consolidation there is increased consolidation in the inferior lateral left lung and there is a small right pleural effusion. No pneumothorax. There is atelectasis in the inferior lingula. MEDIASTINUM: There is cardiomegaly. There are small aorticopulmonary window and periaortic lymph nodes all less than 1 cm in short axis. No pericardial effusion no coronary artery calcification. LIMITED UPPER ABDOMEN: Probable left renal cysts. Cholelithiasis. MUSCULOSKELETAL: Again noted are compression deformities at T6, T7, T8 and T11 these are unchanged from the prior study no acute fracture.     CONCLUSION: 1.  Pulmonary arteries are well-opacified and there is no evidence for pulmonary embolus in either lung. No thoracic aortic aneurysm. 2.  Bilateral airspace infiltrates increased in the left upper lobe compared to the prior study. 3.  Cholelithiasis.        CBC:   Lab Results   Component Value Date    WBC 10.1 02/05/2017    WBC 9.1 06/22/2015    RBC 5.62 02/05/2017     BMP:   Lab Results   Component Value Date    CO2 21 (L) 02/05/2017    BUN 27 (H) 02/05/2017    CREATININE 0.72 02/05/2017    CALCIUM 9.1 02/05/2017     Coagulation:   Lab Results   Component Value Date    INR 1.07 01/07/2017     Cardiac markers:   No results found for: CKMB, TROPONINT, MYOGLOBIN  ABGs:   No results found for: PH

## 2021-06-09 NOTE — PROGRESS NOTES
ASSESSMENT: Qing Lira is a 66 y.o. male with past medical history significant for COPD, right ventricular systolic dysfunction, hyperglycemia, hypertension , recent narcotic overdose who presents today for new patient evaluation of chronic and progressive bilateral low back pain without radicular symptoms.  A CT of the thoracic spine from January 7, 2017 showed compression fractures at T6, T7, T8, and T11.  He is on chronic prednisone for his COPD.  The T7 and T8 fractures are chronic and were present on a CT scan from 2015.  It is unknown when the T6 and T11 compression fractures were sustained.  The patient does not have any history of trauma.  He has not had any imaging of his lumbar spine.    VALENTE: 86  WHO 5: 18    PLAN:  A shared decision making model was used.  The patient's values and choices were respected.  The following represents what was discussed and decided upon by the physician assistant and the patient.  A professional  was present for the visit.    1.  DIAGNOSTIC TESTS: I reviewed the CT of the chest from January 7, 2017.  I placed an order for an MRI of the thoracic and an MRI of the lumbar spine.  I would like to determine the acuity of the T6 and T11 fractures.  I would also like to rule out an additional compression fracture in the lumbar spine, as the patient was quite tender to palpation over the spinous process at approximately L3.    2.  PHYSICAL THERAPY: The patient is currently getting home physical therapy.  I encouraged him to do his exercises.    3.  MEDICATIONS:    -Lidocaine ointment was prescribed today.  He had been prescribed lidocaine patches but these are not covered by his insurance.  -The patient can continue using extra strength Tylenol as needed.  -The patient completed dose of nasal calcitonin.    -I would not recommend using opioids in this patient with his history of recent opioid overdose.    4.  INTERVENTIONS: No interventions were ordered.    5.  PATIENT  EDUCATION: The patient was in agreement the above plan.  All questions were answered.    6.  REFERRALS: I placed a referral for the patient to see the osteoporosis clinic given his multiple atraumatic compression fractures in the setting of chronic steroid use.    7.  FOLLOW-UP:   I will see the patient back in the clinic in about 1 week to review the results of his MRI scans.  If he has any questions or concerns in the meantime, he should not hesitate to contact our clinic.      SUBJECTIVE:  Qing Lira  Is a 66 y.o. male who presents today for new patient evaluation of low back pain.  The patient states that he has had low back pain since 2013.  He denies any injury or event to cause the pain.  It has become progressively more severe, especially over the past 1 year.  He denies any injury to cause the exacerbation of his pain.  The patient was hospitalized in January 2017.  The patient had accidentally overdosed on Percocet and street opium.  He was taking these to treat his low back pain.  A CT scan of the chest revealed compression fractures at T6, T7, T8, and T11.  He was referred to our clinic for further evaluation and treatment.    The patient complains of bilateral low back pain.  The pain does not radiate into the buttocks or down the legs.  The patient states that first began in 2013 it was in the mid back and it wrapped around his trunk.  That pain has resolved.  The patient's pain is aggravated with bending, lifting, standing, and prolonged sitting.  He denies any alleviating factors.  He denies any numbness or tingling in his lower extremities.  His legs feel generally weak.  He has used a cane for assistance with ambulation for at least the past 3 years due to this weakness.  He denies any loss of bowel or bladder control.    The patient is currently getting home care physical therapy.  He has tried chiropractic treatment in the past which made his pain worse.  He has never had any spine surgery or  spine injections.  The patient uses prednisone for his COPD.  He has been on this medication for 3 years.  The patient is currently using Tylenol Extra Strength 2-4 tabs per day.  He used nasal calcitonin during his hospitalization.  He is completed that.  He is no longer taking any prescription opioids.  He denies use of street opium.    Current Outpatient Prescriptions on File Prior to Encounter   Medication Sig Dispense Refill     acetaminophen (MAPAP, ACETAMINOPHEN,) 500 mg coapsule Take 500 mg by mouth every 6 (six) hours. 30 capsule 0     albuterol (PROVENTIL HFA;VENTOLIN HFA) 90 mcg/actuation inhaler Inhale 1-2 puffs every 4 (four) hours as needed. 1 Inhaler 11     ascorbic acid (ASCORBIC ACID WITH RUBIO HIPS) 500 MG tablet Take 1 tablet (500 mg total) by mouth daily. 90 tablet 1     budesonide-formoterol (SYMBICORT) 80-4.5 mcg/actuation inhaler Inhale 2 puffs 2 (two) times a day. 1 Inhaler 11     furosemide (LASIX) 40 MG tablet Take 1 tablet (40 mg total) by mouth daily. 90 tablet 4     hydrocortisone (CORTEF) 5 MG tablet Take 1 tablet (5 mg total) by mouth daily with supper. Start on 2/9 Take 2 tab in the am and 1 tab in the pm  0     omeprazole (PRILOSEC) 20 MG capsule Take 1 capsule (20 mg total) by mouth daily. 30 capsule 11     psyllium (FIBER LAXATIVE, PSYLLIUM HUSK,) 0.52 gram capsule Take 0.52 g by mouth 4 (four) times a day as needed. May take 1-4 tabs daily as needed        senna-docusate (PERICOLACE) 8.6-50 mg tablet Take 1 tablet by mouth daily as needed for constipation.       albuterol (PROVENTIL) 2.5 mg /3 mL (0.083 %) nebulizer solution Take 3 mL (2.5 mg total) by nebulization every 4 (four) hours as needed for shortness of breath. 75 mL 0     calcitonin, salmon, (MIACALCIN) 200 unit/actuation nasal spray 1 spray into each nostril daily. 1.85 mL 0     HELIUM-OXYGEN INHL 3 L into each nostril continuous. Indications: SOB       hydrocortisone 1 % cream Apply 1 application topically 2 (two) times  a day as needed (itching).       ipratropium-albuterol (COMBIVENT RESPIMAT)  mcg/actuation Mist inhaler Inhale 1 puff 4 (four) times a day as needed. 1 Inhaler 11     ipratropium-albuterol (DUO-NEB) 0.5-2.5 mg/3 mL nebulizer Use qid for 5 day then 2-3 times a day as needed (Patient taking differently: Inhale 3 mL every 8 (eight) hours as needed. ) 75 mL 11     [] lidocaine (LIDODERM) 5 % Remove & Discard patch within 12 hours or as directed by MD 30 patch 0     loratadine (CLARITIN) 10 mg tablet Take 10 mg by mouth daily as needed for allergies.        naproxen sodium (ALEVE) 220 MG tablet Take 1 tablet (220 mg total) by mouth 2 (two) times a day with meals.  0     NEBULIZER ACCESSORIES (NEBULIZER MISC) Use as directed.       predniSONE (DELTASONE) 10 MG tablet Take as directed       [DISCONTINUED] furosemide (LASIX) 20 MG tablet Take 1 tablet (20 mg total) by mouth 2 (two) times a day at 9am and 6pm. 60 tablet 0       No Known Allergies    Past Medical History:   Diagnosis Date     Asthma      COPD (chronic obstructive pulmonary disease)     oxygen dep 3 liters     Headache      Hyperglycemia      Hypertension      Pneumonia      Secondary adrenal insufficiency       Patient Active Problem List   Diagnosis     Cough     Abdominal Pain     Chronic Obstructive Pulmonary Disease     Tobacco abuse     Chest pain     Constipation     Hyperglycemia     COPD (chronic obstructive pulmonary disease)     Back pain     Compression fracture of lumbar vertebra     Narcotic overdose, accidental or unintentional, subsequent encounter     Right ventricular systolic dysfunction     Delirium         Past Surgical History:   Procedure Laterality Date     INSERT MIDLINE HE  2017          NO PAST SURGERIES       PICC AND MIDLINE TEAM LINE INSERTION  2017            Family History   Problem Relation Age of Onset     Cancer Mother      Ovarian     No Medical Problems Father      Clotting disorder Neg Hx        Social  history: The patient is .  He does not work.  He denies alcohol, tobacco, or illicit drug use.    ROS: Positive for changes in vision, swelling of feet, shortness of breath, easy bruising, back pain, cold/heat intolerance.  Specifically negative for bowel/bladder dysfunction, fevers,chills, appetite changes, unexplained weight loss.   Otherwise 13 systems reviewed are negative.  Please see the patient's intake questionnaire from today for details.      OBJECTIVE:  PHYSICAL EXAMINATION:    CONSTITUTIONAL:  Vital signs as above.  No acute distress.  The patient is well nourished and well groomed.  PSYCHIATRIC:  The patient is awake, alert, oriented to person, place, time and answering questions appropriately with clear speech.    HEENT: Normocephalic, atraumatic.  Sclera clear.  Neck is supple.  SKIN:  Skin over the face, bilateral lower extremities, and posterior torso is clean, dry, intact without rashes.    GAIT: Ambulates with the assistance of a cane.  The patient is able to rise on his toes and heels bilaterally without difficulty.  STANDING EXAMINATION: Tender to palpation over the spinous process at L3.  Tender to palpation over the mid thoracic spinous processes.  No significant tenderness palpation of the lumbar paraspinous muscles.  MUSCLE STRENGTH:  The patient has 5/5 strength for the bilateral hip flexors, knee flexors/extensors, ankle dorsiflexors/plantar flexors, great toe extensors, ankle evertors/invertors.  NEUROLOGICAL: 1+ patellar, and achilles reflexes bilaterally.  Negative Babinski's bilaterally.  No ankle clonus bilaterally. Sensation to light touch is intact in the bilateral L4, L5, and S1 dermatomes.  VASCULAR:  2/4 dorsalis pedis pulses bilaterally.  Bilateral lower extremities are warm.  There is no pitting edema of the bilateral lower extremities.  ABDOMINAL:  Soft, non-distended, non-tender throughout all quadrants.  No pulsatile mass palpated in the left lower quadrant.  LYMPH  NODES:  No palpable or tender inguinal lymph nodes.  MUSCULOSKELETAL: Straight leg raise negative bilaterally.    RESULTS: CTA of the chest dated February 1, 2017 was reviewed.  This shows compression deformities at T6, T7, T8, and T11.  The T7 and T8 compression fractures were present on a previous CT of the chest dated June 18, 2015.

## 2021-06-09 NOTE — PROGRESS NOTES
Assessment/Plan:        Diagnoses and all orders for this visit:    Vitamin D deficiency  -     Vitamin D, Total (25-Hydroxy); Future    Osteoporosis  -     DXA Bone Density Scan; Future; Expected date: 3/29/17  -     Ambulatory referral to Endocrinology    Compression fracture of thoracic spine, non-traumatic, sequela    Steroid side effects, sequela    Sacroiliac joint dysfunction of both sides    Long-cassette xrays were ordered prior to this patient's appointment and he did not get them done.              It was a pleasure to evaluate Qing Lira at the kind referral of Charlee Edouard PA-C for thoracic compression fractures.    There are no acute compression fractures in the thoracic spine, but chronic T6, T7, T8, and T11 compression fracture. There is no spinal cord compression or myelopathy. His pain is in his low back, so I don't think that vertebroplasty would be helpful here, additionally there are no acute findings to his thoracic fractures.    This patient did not get the standing long-cassette xrays that I had ordered prior to his appointment, so I cannot assess his sagittal alignment. Presumably he has kyphosis because of the compression fractures, but his medical comorbidities with oxygen-dependence from COPD and need for daily oral steroids would likely make spinal deformity correction surgery risk greater than benefit to him at this time; he would be at high risk for prolonged intubation and tracheostomy, as well as pseudoarthrosis. He does not meet any acute surgical indications.     For his multilevel compression fractures, which were sustained without major trauma and therefore are consistent with osteoporosis, I recommend that he have a DEXA scan, a vitamin D level, and an Endocrinology referral for bone density supplementation medication. I have ordered DEXA, vitamin D, Endocrinology referral for him. He would not be a candidate for any elective spinal deformity correction surgery given likely  "osteoporosis until his bone density is corrected with medications, and with longterm prednisone usage his likelihood of failing to heal any spinal fusion surgery is very high.    It is important that his bone density be evaluated and corrected NOW to prevent further compression fractures.    [facility]. He does have sacroiliac joint-mediated pain on his exam, and this may be a useful site for pain control attempts with targeted injection. If the patient follows through with being placed on a medication for osteoporosis to improve his bone density and gets his xrays, he is welcome to see me for further discussion, but there is nothing surgical that I can offer him at this time.    I performed independent visualization of radiographic imaging and entered my own interpretation, \"reviewed and/or ordered clinical laboratory tests, reviewed and/or ordered tests in radiology, reviewed and/or ordered medical tests, made the decision to obtain old records and/or history from someone other than the patient and Reviewed and summarized old records and/or discussed this case with another health care provider\".        Subjective:    Patient ID: Qing Lira is a 66 y.o. male.    HPI  Qing Lira was examined with the assistance of a qualified .    Qing Lira presents with low back pain in a band across his lumbar spine, no thoracic back pain, no radiating of pain. Exacerbating factors are standing and walking, relieved somewhat with rest.      I obtained and reviewed Charlee Edouard's [facility] evaluation from 3/9 and 3/20/17 indicating: patient presented low back pain, no thoracic back pain, no leg symptoms, and referral was placed for surgical consideration because of concern for sagittal imbalance causing low back pain.    He has severe COPD and is oxygen-dependent. He is steroid-dependent on daily oral prednisone. He has cardiac dysfunction with " right ventricular systolic dysfunction due to his severe pulmonary disease. He was hospitalized for pulmonary reasons for a week in February 2017.      IMAGING per my interpretation:  MRI lumbar and thoracic spine 3/17 with chronic T6, T7, T8 and T11 compression fractures. Mild retropulsion of T11 fracture with preservation of CSF around spinal cord and no cord contact or compression.  CT abd/pelvis 3/2015 did not show any T11 compression fracture  T11 compression fracture seen opportunistically from CTA PE protocol stable from Jan and Feb 2017 to MRI 3/17        Review of Systems   Constitutional: Positive for fatigue. Negative for activity change, appetite change, chills, fever and unexpected weight change.   HENT: Negative for dental problem, mouth sores and trouble swallowing.    Eyes: Negative for visual disturbance.   Respiratory: Positive for cough, chest tightness, shortness of breath and wheezing.    Cardiovascular: Positive for chest pain and leg swelling.   Gastrointestinal: Negative for abdominal pain, constipation, diarrhea, nausea and vomiting.   Endocrine: Negative for cold intolerance.   Genitourinary: Negative for difficulty urinating, dysuria, enuresis, frequency and urgency.   Musculoskeletal: Positive for arthralgias, back pain and gait problem. Negative for neck pain and neck stiffness.   Skin: Negative for color change.   Allergic/Immunologic: Negative for immunocompromised state.   Neurological: Negative for tremors, speech difficulty, weakness, numbness and headaches.   Hematological: Does not bruise/bleed easily.   Psychiatric/Behavioral: The patient is not nervous/anxious.              Past Medical History:   Diagnosis Date     Asthma      COPD (chronic obstructive pulmonary disease)     oxygen dep 3 liters     Headache      Hyperglycemia      Hypertension      Pneumonia      Secondary adrenal insufficiency      Past Surgical History:   Procedure Laterality Date     INSERT MIDLINE HE   2/1/2017          NO PAST SURGERIES       PICC AND MIDLINE TEAM LINE INSERTION  1/7/2017          Social History     Social History     Marital status:      Spouse name: N/A     Number of children: N/A     Years of education: N/A     Occupational History     Not on file.     Social History Main Topics     Smoking status: Former Smoker     Packs/day: 1.00     Years: 30.00     Quit date: 3/1/2015     Smokeless tobacco: Never Used     Alcohol use No      Comment: Smoking cessation packet provided     Drug use: No     Sexual activity: Not on file     Other Topics Concern     Not on file     Social History Narrative     Family History   Problem Relation Age of Onset     Cancer Mother      Ovarian     No Medical Problems Father      Clotting disorder Neg Hx              Objective:    Physical Exam   Constitutional: He is oriented to person, place, and time. He appears well-developed and well-nourished. He is cooperative. No distress.   HENT:   Head: Normocephalic and atraumatic.   Eyes: Conjunctivae are normal.   Neck: Normal range of motion. Neck supple. No spinous process tenderness and no muscular tenderness present. No tracheal deviation present.   Cardiovascular: Normal rate and regular rhythm.    Pulmonary/Chest: Accessory muscle usage present. He has wheezes. He has rhonchi.   Abdominal: Soft. Bowel sounds are normal. He exhibits no distension. There is no tenderness.   Musculoskeletal:   Cervical flexion/extension ROM: normal  Lumbar flexion/extension ROM: flexion only to 45 degrees due to pain, reduced extension due to pain   Neurological: He is alert and oriented to person, place, and time. No cranial nerve deficit or sensory deficit. He displays a negative Romberg sign. Gait normal. He displays no Babinski's sign on the right side. He displays no Babinski's sign on the left side.   Reflex Scores:       Bicep reflexes are 2+ on the right side and 2+ on the left side.       Brachioradialis reflexes are 2+  on the right side and 2+ on the left side.       Patellar reflexes are 2+ on the right side and 2+ on the left side.       Achilles reflexes are 2+ on the right side and 2+ on the left side.  Strength:                                                LEFT      RIGHT  Deltoid                                     5            5  Bicep                                       5            5  Wrist Extensor                        5            5   Tricep                                      5            5  Finger Flexion                         5             5  Finger Abduction                     5            5                                            5           5    Hip Flexion                               5            5   Knee Extension                       5             5  Dorsiflexion                              5             5  Extensor Hallucis Longus        5            5  Plantar Flexion                         5             5    No Lhermitte's, No Spurling's  No Veena's   No ankle clonus        SI Joint EXAM:                                        LEFT      RIGHT  Grady Finger Test            +             +  PSIS tenderness            +           +  Thigh Thrust                   +            +  TIANNA                            +            +  Pelvic gapping                -              -  Pelvic compression        -              -  Gaenslen's                      +            +  Sacral Thrust                  -              -    Hip EXAM:  Axial loading/posterior     -              -  Impingement                                Medial femoral   Impingement:                  -              -       Skin: Skin is warm, dry and intact.   Psychiatric: He has a normal mood and affect. His speech is normal and behavior is normal.

## 2021-06-09 NOTE — PROGRESS NOTES
Assessment:   Qing Lira is a 66 y.o. y.o. male with past medical history significant for COPD, right ventricular systolic dysfunction, hyperglycemia, hypertension, recent narcotic overdose who presents today for follow-up regarding chronic and progressive bilateral low back pain without radicular symptoms.  An MRI of the thoracic spine shows chronic moderate compression fractures of T6 through T8 with chronic moderate to severe compression deformity at T11.  This is associated with mildly exaggerated thoracic kyphosis and there is mild retropulsion at T11.  The MRI of the lumbar spine does not show any compression fracture and there are no significant degenerative changes in the lumbar spine.  I am concerned that his low back pain is secondary to problems with sagittal balance secondary to his compression fractures.       Plan:     A shared decision making plan was used.  The patient's values and choices were respected.  The following represents what was discussed and decided upon by the physician assistant and the patient.      1.  DIAGNOSTIC TESTS: I reviewed the MRI scans of the thoracic and lumbar spine.  No further diagnostic tests were ordered.    2.  PHYSICAL THERAPY: The patient is currently getting home physical therapy.  I encouraged him to continue doing his exercises.    3.  MEDICATIONS: No changes are made to the patient's medications.  I prescribed lidocaine ointment for the patient when I saw him in consultation on March 9.  He is awaiting that prescription.  He can continue using extra strength Tylenol as needed.  I would not recommend using opioids in this patient with his history of recent accidental opioid overdose.    4.  INTERVENTIONS: No interventions were ordered.    5.  REFERRALS:    -I Put in referral for the patient to see Dr. Doss regarding his back pain and spinal deformity.  -I had referred the patient to the osteoporosis clinic when I saw him on March 9.  The patient states that he  has not yet been contacted for a consultation there.  We provided the patient with the number for the osteoporosis clinic.  I told him if he has not heard from their clinic within 1 week, to call our clinic.    6.  FOLLOW-UP: I have not scheduled any routine follow-up for the patient.  We will await Dr. Doss's recommendations.  If he has any questions or concerns, he should not hesitate to call.    Subjective:     Qing Lira is a 66 y.o. male who presents today for follow-up regarding chronic and progressive bilateral low back pain without radicular symptoms.  I saw the patient in consultation on March 9, 2017.  At that time I ordered an MRI scans of the thoracic and lumbar spine to evaluate the acuity of known compression fractures in his thoracic spine and to evaluate for possible compression fracture in the lumbar spine.  The patient returns today to review those results.    The patient denies any change in his pain since he was last seen.  He continues to complain of centralized low back pain.  The pain does span across low back and a broadband distribution of the beltline.  He has some numbness in the same distribution as his pain.  He denies any pain radiating into the buttocks or down the legs.  He denies any numbness or tingling in the legs.  The patient feels generally weak, but denies any focal weakness.  He rates his pain today as a 6 out of 10.  At its best is a 4-10.  At its worst it is an 8 out of 10.  The patient's pain is aggravated with walking and standing.  Changes in weather also tend to aggravate his pain.  His pain is alleviated with sitting and laying down.  He denies any new symptoms since he was last seen.    The patient has been getting home physical therapy.  The patient uses extra strength Tylenol as needed for pain.  I have prescribed lidocaine ointment for the patient has not yet gotten that prescription.  He did have nasal calcitonin when he was hospitalized last month.  The  patient is on chronic prednisone for COPD.    Past medical history is reviewed and is unchanged in the interim.    Family history is reviewed and is unchanged in the interim.    Review of Systems:  Positive for numbness/tingling, weakness.  Negative for loss of bowel/bladder control, footdrop, headache, dizziness, nausea/vomiting, blurry vision, balance changes.     Objective:   CONSTITUTIONAL:  Vital signs as above.  No acute distress.  The patient is well nourished and well groomed.    PSYCHIATRIC:  The patient is awake, alert, oriented to person, place and time.  The patient is answering questions appropriately with clear speech.  Normal affect.  HEENT: Normocephalic, atraumatic.  Sclera clear.    SKIN:  Skin over the face, posterior torso, bilateral upper and lower extremities is clean, dry, intact without rashes.  MUSCULOSKELETAL: Ambulates with the assistance of a cane.    The patient has 5/5 strength for the bilateral hip flexors, knee flexors/extensors, ankle dorsiflexors/plantar flexors, ankle evertors/invertors.    NEUROLOGICAL: 1+ patellar, achilles reflexes which are symmetric bilaterally.  No ankle clonus bilaterally.  Sensation to light touch is intact in the bilateral L4, L5, and S1 dermatomes.       RESULTS:    MRI of the thoracic spine from [facility] dated March 15, 2017 is reviewed.  There are chronic moderate compression deformities of T6 through T8 with chronic moderate to severe compression deformity at T11.  This is associated with a mildly exaggerated thoracic kyphosis.  There is mild posterior retropulsion at T11 which mildly narrows the spinal canal.  The spinal canal and neural foramina are otherwise patent.    MRI of the lumbar spine from [facility] dated March 15, 2017 is reviewed.  There are no lumbar compression fractures.  There is no significant degenerative change in the lumbar spine with a widely patent spinal canal and neural foramina at all levels.  Please see report for further  details.

## 2021-06-09 NOTE — PROGRESS NOTES
A consult was placed to Dr. Doss.  The reason for the consult was back pain.   The XR were ordered to assess for scoliosis.  Lizzie Domínguez RN, CNRN

## 2021-06-10 NOTE — PROGRESS NOTES
Assessment/Plan:        Diagnoses and all orders for this visit:    Simple chronic bronchitis  -     Check Pulse Oximetry while ambulating  -     Oxygen via concentrator    Dyspnea on exertion  -     Check Pulse Oximetry while ambulating  -     Oxygen via concentrator    Chronic respiratory failure with hypoxia      Medication Plan  Symbicort 2 puffs twice daily - rinse your mouth out after using  Albuterol inhaler as needed  Combivent as needed up to 4 times per day    Oxygen Plan  No oxygen arrest  Oxygen with activity as directed by symptoms.    Exercise Plan  5 minutes of gentle walking 3 times daily.    On 3 L/min there is no significant desaturations at night.    Due to desaturation of SaO2 less than or equal to 88% on room air at rest from COPD, home oxygen therapy will benefit my patient's condition.  The patient has tried (or considered) other medications with limited success and oxygen is still required. The patient is mobile in the home and requires portability.            Subjective:    Patient ID: Qing Lira is a 66 y.o. male.    HPI Comments: Dyspnea - unchanged since his last visit. worse with exertion, better with rest.  Symptoms localized to the chest.  He does bring up some sputum.  No hemoptysis.  His inhalers seem to help a little bit.    Chronic hypoxic - respiratory failure-currently on 3 L/min as needed.  No longer wearing it continuously.  He is tolerating the oxygen well.  No significant issues with a.    COPD - no episodes of bronchitis or pneumonia.  He is currently taking his inhalers.  He has not had any admissions.  No significant sputum production.      --- from previous  Back pain - improved    Sleep disordered breathing.  Suspicion in hospital. No previous diagnosis.  He has never had a sleep study before.  No snoring.  Sleep is ok.    COPD - had some bronchitis about 2-3 months ago.    Symbicort 2 puffs 2 times daily  Combivent 2 times daily  Ventolin 2 times daily    Chronic  Hypoxic respiratory failure - was prescribed chronic oxygen after hospitalization.  He is using his oxygen less.  He does feel that his oxygen helps make his breathing easier.    Dyspnea: worse with exertion.  Better with rest.  Oxygen does help sometimes.  Symptoms localize to his chest.  Basically stable to 2-3 months ago.  No leg swelling, no hemoptysis.    --- from previous  66M here for follow up of COPD.    He is feeling better since his last visit.  He is more independent in his walking and has less problems with changes in position.    He uses his symbicort twice daily.    He is using his nebulizer variably depending how he is doing.    The biggest trigger for him is cold weather.    Seen with assistance of .      --- from previous  Here today to follow up COPD now feeling better.  His breathing has improved to nearly normal.  Sometimes he is a little short of breath with more significant exercise but this resolves with rest.  No clear triggers.  No smoking since last April.    Using nebulizer every 1-3 days.  Using symbicort every 1-3 days.    --- from previous  Feeling the same as previous.  Went camping recently so is a little tired.  Not wheezy.  At last visit, reported not taking symbicort.  Using nebulizer 3 times daily and it helps.    ---from previous  65M here for first visit after admission for COPD exacerbation several weeks ago. He is feeling better now.  His symptoms have slowly improved since then.  His primary symptom is dyspnea.  It is worse with exertion.  It is better with rest.  Symptoms localize to chest and throat.  He has no sputum, hemoptysis, wheezing, or other symptoms.  He had never had an episode like this before.  Since then he has stopped smoking.  Per his report, his PMD stopped his symbicort and he has tolerated this well.  He is using nebs 2-3 times daily.        Review of Systems again today negative except as in HPI for 4 systems: constitutional, neurologic,  pulmonary, cardiovascular  Current Outpatient Prescriptions on File Prior to Visit   Medication Sig Dispense Refill     acetaminophen (MAPAP, ACETAMINOPHEN,) 500 mg coapsule Take 500 mg by mouth every 6 (six) hours. 30 capsule 0     albuterol (PROVENTIL HFA;VENTOLIN HFA) 90 mcg/actuation inhaler Inhale 1-2 puffs every 4 (four) hours as needed. 1 Inhaler 11     albuterol (PROVENTIL) 2.5 mg /3 mL (0.083 %) nebulizer solution Take 3 mL (2.5 mg total) by nebulization every 4 (four) hours as needed for shortness of breath. 75 mL 0     ammonium lactate (LAC-HYDRIN) 12 % lotion   2     ascorbic acid (ASCORBIC ACID WITH RUBIO HIPS) 500 MG tablet Take 1 tablet (500 mg total) by mouth daily. 90 tablet 1     budesonide-formoterol (SYMBICORT) 80-4.5 mcg/actuation inhaler Inhale 2 puffs 2 (two) times a day. 1 Inhaler 11     calcitonin, salmon, (MIACALCIN) 200 unit/actuation nasal spray 1 spray into each nostril daily. 1.85 mL 0     clotrimazole-betamethasone (LOTRISONE) cream   5     furosemide (LASIX) 40 MG tablet Take 1 tablet (40 mg total) by mouth daily. 90 tablet 4     HELIUM-OXYGEN INHL 3 L into each nostril continuous. Indications: SOB       hydrocortisone (CORTEF) 5 MG tablet Take 1 tablet (5 mg total) by mouth daily with supper. Start on 2/9 Take 2 tab in the am and 1 tab in the pm  0     hydrocortisone 1 % cream Apply 1 application topically 2 (two) times a day as needed (itching).       ipratropium-albuterol (COMBIVENT RESPIMAT)  mcg/actuation Mist inhaler Inhale 1 puff 4 (four) times a day as needed. 1 Inhaler 11     ipratropium-albuterol (DUO-NEB) 0.5-2.5 mg/3 mL nebulizer Use qid for 5 day then 2-3 times a day as needed (Patient taking differently: Inhale 3 mL every 8 (eight) hours as needed. ) 75 mL 11     lidocaine (XYLOCAINE) 5 % ointment Apply to affected area as needed 35.44 g 0     loratadine (CLARITIN) 10 mg tablet Take 10 mg by mouth daily as needed for allergies.        naproxen sodium (ALEVE) 220 MG  tablet Take 1 tablet (220 mg total) by mouth 2 (two) times a day with meals.  0     NEBULIZER ACCESSORIES (NEBULIZER MISC) Use as directed.       omeprazole (PRILOSEC) 20 MG capsule Take 1 capsule (20 mg total) by mouth daily. 30 capsule 11     predniSONE (DELTASONE) 10 MG tablet Take as directed       psyllium (FIBER LAXATIVE, PSYLLIUM HUSK,) 0.52 gram capsule Take 0.52 g by mouth 4 (four) times a day as needed. May take 1-4 tabs daily as needed        PULMO-AIDE COMPRESSOR Amy   0     senna-docusate (PERICOLACE) 8.6-50 mg tablet Take 1 tablet by mouth daily as needed for constipation.       No current facility-administered medications on file prior to visit.      /62  Pulse 97  Resp 18  Wt 159 lb 3.2 oz (72.2 kg)  SpO2 95% Comment: RA-pt uses 3L of O2 PRN  BMI 29.59 kg/m2          Objective:    Physical Exam   Constitutional: He is oriented to person, place, and time. He appears well-developed and well-nourished. No distress.   HENT:   Head: Normocephalic.   Mouth/Throat: Oropharynx is clear and moist. No oropharyngeal exudate.   Eyes: Pupils are equal, round, and reactive to light. Right eye exhibits no discharge. Left eye exhibits no discharge. No scleral icterus.   Neck: Normal range of motion.   Cardiovascular: Normal rate and regular rhythm.  Exam reveals no gallop and no friction rub.    No murmur heard.  Pulmonary/Chest: Effort normal and breath sounds normal. No respiratory distress. He has no wheezes. He has no rales.   Abdominal: Soft. Bowel sounds are normal. He exhibits no distension. There is no tenderness.   Musculoskeletal: He exhibits no deformity.   Neurological: He is alert and oriented to person, place, and time. No cranial nerve deficit.   Skin: Skin is warm and dry. No rash noted. He is not diaphoretic. No erythema. No pallor.   Psychiatric: He has a normal mood and affect. His behavior is normal. Judgment and thought content normal.               Medical History  Active Ambulatory  (Non-Hospital) Problems    Diagnosis     Osteoporosis     Steroid side effects, sequela     Delirium     Narcotic overdose, accidental or unintentional, subsequent encounter     Right ventricular systolic dysfunction     Adenomatous polyp of colon     Compression fracture of thoracic spine, non-traumatic     Back pain     Chronic adrenal insufficiency     Hyperglycemia     COPD (chronic obstructive pulmonary disease)     Constipation     Tobacco abuse     Chest pain     Chronic Obstructive Pulmonary Disease     Cough     Abdominal Pain     Past Medical History:   Diagnosis Date     Asthma      COPD (chronic obstructive pulmonary disease)      Headache      Hyperglycemia      Hypertension      Pneumonia      Secondary adrenal insufficiency          social history reviewed: He is her this on today.  He had to cancel a trip to [place] because of oxygen issues.  He is going to go to [place] in July.      Allergies  No Known Allergies                           Data Review - imaging, labs, and ekgs listed below were reviewed by me.  CXR and chest CT images and EKG tracings interpreted personally.     Past Labs  Hospital Outpatient Visit on 02/09/2017   Component Date Value     IVSd 02/09/2017 1.16      LVIDd 02/09/2017 4.87      LVIDs 02/09/2017 2.5      LV PWd 02/09/2017 1.14      AO root 02/09/2017 3.6      AO root 02/09/2017 3.6      TR peak emi 02/09/2017 251      TR peak emi 02/09/2017 221      TR peak emi 02/09/2017 238      LA area 1 02/09/2017 13.1      LA length 02/09/2017 5.01      TAPSE 02/09/2017 1.9      TAPSE 02/09/2017 1.9      TAPSE 02/09/2017 1.9      BSA 02/09/2017 1.71      Hieght 02/09/2017 63      Weight 02/09/2017 2336      BP 02/09/2017 128/72      HR 02/09/2017 88      IVS/PW ratio 02/09/2017 1.0      LV FS 02/09/2017 48.7      LV mass 02/09/2017 211.2      LV mass index 02/09/2017 123.5      Height 02/09/2017 63.0      Weight 02/09/2017 146      Echo LVEF Estimated 02/09/2017 60    Admission on  02/01/2017, Discharged on 02/06/2017   No results displayed because visit has over 200 results.      Admission on 01/07/2017, Discharged on 01/13/2017   No results displayed because visit has over 200 results.        Past Imaging  Mr Thoracic Spine Without Contrast    Result Date: 3/16/2017  [facility] MR THORACIC SPINE WO CONTRAST, MR LUMBAR SPINE WO CONTRAST 3/15/2017 6:17 PM INDICATION: Back pain with thoracic spine compression fractures on recent CTA chest. TECHNIQUE: Routine. CONTRAST: None. COMPARISON: CTA chest dated 01/07/2017. THORACIC SPINE: Moderate chronic compression deformities of the T6, T7, and T8 vertebral bodies. Vertebral body height loss is stable compared to prior chest CT. Additional moderate to severe compression deformity at T11 appears grossly stable when compared to prior chest CT. Small amount of fluid tracking along the superior endplate of T11 potentially reflects a small amount of fluid along a persistent fracture line. No osseous edema at T6 through T8. While there is mild posterior retropulsion at the posterior superior aspect of T11, there is no significant retropulsion at the T6 through T8 compression deformities. This is associated with a mildly exaggerated thoracic kyphosis. No findings to suggest acute or new compression fractures. Mild posterior retropulsion at T11 results in a mild spinal canal stenosis. No significant posterior disc bulges. Mild facet arthropathy bilaterally at T10-T11, with a small amount of fluid in the right T10-T11 facet joint. No significant neural foraminal stenosis. No cord signal abnormality. Visualized paraspinal soft tissues are grossly unremarkable. Lumbar spine: Nomenclature is based on 5 lumbar type vertebral bodies. Normal vertebral body heights, alignment and marrow signal. No pars defect. The conus tip is identified at L1-L2. Numerous T2 hyperintense lesions in the bilateral kidneys, presumably  reflecting cysts. Normal aortic diameter.  The visualized bony pelvis and proximal femora are grossly normal. T12-L1 through L3-L4: Normal disc height and signal. No posterior disc bulge. No facet arthropathy. Spinal canal and neural foramina are widely patent. L4-L5: Mild disc desiccation with a preserved disc space height. No posterior disc bulge. No facet arthropathy. Spinal canal and neural foramina are widely patent. L5-S1: Normal disc height and signal. No posterior disc bulge. No significant facet arthropathy. Spinal canal and neural foramina are widely patent.     CONCLUSION: THORACIC SPINE: 1.  Chronic moderate compression deformities of T6 through T8 with a chronic moderate to severe compression deformity at T11. This is associated with a mildly exaggerated thoracic kyphosis. Mild posterior retropulsion mildly narrows the spinal canal at the T11 level. Spinal canal and neural foramina are otherwise patent. LUMBAR SPINE: 1.  No findings for lumbar spine compression fracture. No significant degenerative change in the lumbar spine with a widely patent spinal canal and neural foramina at all levels.    Mr Lumbar Spine Without Contrast    Result Date: 3/16/2017  [facility] MR THORACIC SPINE WO CONTRAST, MR LUMBAR SPINE WO CONTRAST 3/15/2017 6:17 PM INDICATION: Back pain with thoracic spine compression fractures on recent CTA chest. TECHNIQUE: Routine. CONTRAST: None. COMPARISON: CTA chest dated 01/07/2017. THORACIC SPINE: Moderate chronic compression deformities of the T6, T7, and T8 vertebral bodies. Vertebral body height loss is stable compared to prior chest CT. Additional moderate to severe compression deformity at T11 appears grossly stable when compared to prior chest CT. Small amount of fluid tracking along the superior endplate of T11 potentially reflects a small amount of fluid along a persistent fracture line. No osseous edema at T6 through T8. While there is mild posterior retropulsion at the posterior superior aspect of T11, there is  no significant retropulsion at the T6 through T8 compression deformities. This is associated with a mildly exaggerated thoracic kyphosis. No findings to suggest acute or new compression fractures. Mild posterior retropulsion at T11 results in a mild spinal canal stenosis. No significant posterior disc bulges. Mild facet arthropathy bilaterally at T10-T11, with a small amount of fluid in the right T10-T11 facet joint. No significant neural foraminal stenosis. No cord signal abnormality. Visualized paraspinal soft tissues are grossly unremarkable. Lumbar spine: Nomenclature is based on 5 lumbar type vertebral bodies. Normal vertebral body heights, alignment and marrow signal. No pars defect. The conus tip is identified at L1-L2. Numerous T2 hyperintense lesions in the bilateral kidneys, presumably  reflecting cysts. Normal aortic diameter. The visualized bony pelvis and proximal femora are grossly normal. T12-L1 through L3-L4: Normal disc height and signal. No posterior disc bulge. No facet arthropathy. Spinal canal and neural foramina are widely patent. L4-L5: Mild disc desiccation with a preserved disc space height. No posterior disc bulge. No facet arthropathy. Spinal canal and neural foramina are widely patent. L5-S1: Normal disc height and signal. No posterior disc bulge. No significant facet arthropathy. Spinal canal and neural foramina are widely patent.     CONCLUSION: THORACIC SPINE: 1.  Chronic moderate compression deformities of T6 through T8 with a chronic moderate to severe compression deformity at T11. This is associated with a mildly exaggerated thoracic kyphosis. Mild posterior retropulsion mildly narrows the spinal canal at the T11 level. Spinal canal and neural foramina are otherwise patent. LUMBAR SPINE: 1.  No findings for lumbar spine compression fracture. No significant degenerative change in the lumbar spine with a widely patent spinal canal and neural foramina at all levels.        CBC:   Lab  Results   Component Value Date    WBC 10.1 02/05/2017    WBC 9.1 06/22/2015    RBC 5.62 02/05/2017     BMP:   Lab Results   Component Value Date    CO2 21 (L) 02/05/2017    BUN 27 (H) 02/05/2017    CREATININE 0.72 02/05/2017    CALCIUM 9.1 02/05/2017     Coagulation:   Lab Results   Component Value Date    INR 1.07 01/07/2017     Cardiac markers:   No results found for: CKMB, TROPONINT, MYOGLOBIN  ABGs:   No results found for: PH

## 2021-06-15 PROBLEM — J96.20 ACUTE ON CHRONIC RESPIRATORY FAILURE (H): Status: ACTIVE | Noted: 2017-01-07

## 2021-06-15 PROBLEM — M81.0 OSTEOPOROSIS: Status: ACTIVE | Noted: 2017-03-29

## 2021-06-15 PROBLEM — T40.601D: Status: ACTIVE | Noted: 2017-02-01

## 2021-06-15 PROBLEM — J44.1 COPD EXACERBATION (H): Status: ACTIVE | Noted: 2017-01-07

## 2021-06-15 PROBLEM — T38.0X5S: Status: ACTIVE | Noted: 2017-03-29

## 2021-06-16 PROBLEM — G40.209 COMPLEX PARTIAL SEIZURES (H): Status: ACTIVE | Noted: 2018-01-01

## 2021-06-16 PROBLEM — I63.9 STROKE (H): Status: ACTIVE | Noted: 2018-01-01

## 2021-06-16 PROBLEM — J96.00 ACUTE RESPIRATORY FAILURE (H): Status: ACTIVE | Noted: 2018-01-01

## 2021-06-16 PROBLEM — K85.90 ACUTE PANCREATITIS: Status: ACTIVE | Noted: 2018-01-01

## 2021-06-16 PROBLEM — A41.9 SEVERE SEPSIS (H): Status: ACTIVE | Noted: 2018-01-01

## 2021-06-16 PROBLEM — K81.9 CHOLECYSTITIS: Status: ACTIVE | Noted: 2018-01-01

## 2021-06-16 PROBLEM — R41.82 ALTERED MENTAL STATUS: Status: ACTIVE | Noted: 2018-01-01

## 2021-06-16 PROBLEM — I10 HYPERTENSION: Status: ACTIVE | Noted: 2018-01-01

## 2021-06-16 PROBLEM — E23.6 EMPTY SELLA SYNDROME (H): Status: ACTIVE | Noted: 2017-03-29

## 2021-06-16 PROBLEM — R65.20 SEVERE SEPSIS (H): Status: ACTIVE | Noted: 2018-01-01

## 2021-06-16 PROBLEM — K81.1 CHRONIC CHOLECYSTITIS: Status: ACTIVE | Noted: 2018-01-01

## 2021-06-18 NOTE — ANESTHESIA CARE TRANSFER NOTE
Last vitals:   Vitals:    06/12/18 0919   BP: 151/74   Pulse: 88   Resp: 18   Temp: 36.5  C (97.7  F)   SpO2: 100%     Patient's level of consciousness is awake  Spontaneous respirations: yes  Maintains airway independently: yes  Dentition unchanged: yes  Oropharynx: oropharynx clear of all foreign objects    QCDR Measures:  ASA# 20 - Surgical Safety Checklist: WHO surgical safety checklist completed prior to induction  PQRS# 430 - Adult PONV Prevention: 4558F - Pt received => 2 anti-emetic agents (different classes) preop & intraop  ASA# 8 - Peds PONV Prevention: NA - Not pediatric patient, not GA or 2 or more risk factors NOT present  PQRS# 424 - Angelica-op Temp Management: 4559F - At least one body temp DOCUMENTED => 35.5C or 95.9F within required timeframe  PQRS# 426 - PACU Transfer Protocol: - Transfer of care checklist used  ASA# 14 - Acute Post-op Pain: ASA14B - Patient did NOT experience pain >= 7 out of 10

## 2021-06-18 NOTE — ANESTHESIA PREPROCEDURE EVALUATION
Anesthesia Evaluation      Patient summary reviewed   No history of anesthetic complications     Airway   Mallampati: III  Neck ROM: full   Pulmonary - normal exam   (+) pneumonia, COPD, asthma                           Cardiovascular - normal exam  (+) hypertension, ,      Neuro/Psych    (+) CVA ,     Endo/Other       GI/Hepatic/Renal            Dental - normal exam                        Anesthesia Plan  Planned anesthetic: general endotracheal    ASA 2   Induction: intravenous   Anesthetic plan and risks discussed with: patient  Anesthesia plan special considerations: antiemetics,   Post-op plan: routine recovery

## 2021-06-18 NOTE — ANESTHESIA CARE TRANSFER NOTE
Last vitals:   Vitals:    06/13/18 1010   BP: 137/85   Pulse: 92   Resp: 16   Temp: 37.2  C (99  F)   SpO2: 95%     Patient's level of consciousness is awake  Spontaneous respirations: yes  Maintains airway independently: yes  Dentition unchanged: yes  Oropharynx: oropharynx clear of all foreign objects    QCDR Measures:  ASA# 20 - Surgical Safety Checklist: WHO surgical safety checklist completed prior to induction  PQRS# 430 - Adult PONV Prevention: 4558F - Pt received => 2 anti-emetic agents (different classes) preop & intraop  ASA# 8 - Peds PONV Prevention: NA - Not pediatric patient, not GA or 2 or more risk factors NOT present  PQRS# 424 - Angelica-op Temp Management: 4559F - At least one body temp DOCUMENTED => 35.5C or 95.9F within required timeframe  PQRS# 426 - PACU Transfer Protocol: - Transfer of care checklist used  ASA# 14 - Acute Post-op Pain: ASA14B - Patient did NOT experience pain >= 7 out of 10

## 2021-06-18 NOTE — PROGRESS NOTES
I was consulted by Sophy Leon MD to evaluate this patients gall bladder.    HPI: Qing Lira is a 68 y.o. male who was in the hospital at [facility] in mid May with cholecystitis.  They got a cholecystostomy tube at that time which is still draining.  The Arleen tube cystogram shows filling into the GB but not past the Cystic Duct which appears filled with stones.. Since leaving the hospital the pt has done fairly well.  The pt has COPD and is on home O2.      Allergies:Review of patient's allergies indicates no known allergies.    Past Medical History:   Diagnosis Date     Asthma      COPD (chronic obstructive pulmonary disease)     oxygen dep 3 liters     Headache      Hyperglycemia      Hypertension      Pneumonia      Secondary adrenal insufficiency        Past Surgical History:   Procedure Laterality Date     INSERT MIDLINE HE  2/1/2017          NO PAST SURGERIES       PICC  4/30/2018          PICC AND MIDLINE TEAM LINE INSERTION  1/7/2017            CURRENT MEDS:    Current Outpatient Prescriptions:      acetaminophen (TYLENOL) 500 MG tablet, Take 500 mg by mouth every 6 (six) hours as needed for pain., Disp: , Rfl:      aspirin 81 mg chewable tablet, Chew 1 tablet (81 mg total) daily. Do not take your aspirin until 5/26/18. Then resume taking 1 tablet daily., Disp: 30 tablet, Rfl: 0     atorvastatin (LIPITOR) 10 MG tablet, Take 1 tablet (10 mg total) by mouth daily., Disp: 30 tablet, Rfl: 0     budesonide-formoterol (SYMBICORT) 80-4.5 mcg/actuation inhaler, Inhale 2 puffs 2 (two) times a day., Disp: 1 Inhaler, Rfl: 11     COMBIVENT RESPIMAT  mcg/actuation Mist inhaler, INHALE 1 PUFF BY MOUTH 4 TIMES DAILY AS NEEDED/YOG TXOG SIAV TXHUA HNUB NQUS 1 PA 4 ZAUG, Disp: 4 g, Rfl: 3     fenofibrate (TRICOR) 48 MG tablet, Take 1 tablet (48 mg total) by mouth daily., Disp: 30 tablet, Rfl: 0     FIBER LAXATIVE, CA POLYCARBO, 625 mg tablet, Take 1-4 tablets by mouth daily., Disp: , Rfl:      gabapentin (NEURONTIN) 100  MG capsule, Take 200 mg by mouth 3 (three) times a day., Disp: , Rfl:      HELIUM-OXYGEN INHL, 3 L into each nostril continuous. Indications: SOB, Disp: , Rfl:      hydrocortisone (CORTEF) 5 MG tablet, Take 5 mg by mouth daily with supper., Disp: , Rfl:      hydrocortisone 1 % cream, Apply 1 application topically 2 (two) times a day as needed (itching)., Disp: , Rfl:      HYDROmorphone (DILAUDID) 2 MG tablet, Take 1 tablet (2 mg total) by mouth every 6 (six) hours as needed for pain., Disp: 6 tablet, Rfl: 0     ipratropium-albuterol (DUO-NEB) 0.5-2.5 mg/3 mL nebulizer, Inhale 3 mL every 8 (eight) hours as needed (shortness of breath or wheezing)., Disp: 12 vial, Rfl: 0     levETIRAcetam (KEPPRA) 500 MG tablet, Take 1 tablet (500 mg total) by mouth 2 (two) times a day. Take one tablet on the evening of 5/20. Then start taking 1 tablet two (2) times a day after this., Disp: 60 tablet, Rfl: 0     lidocaine (XYLOCAINE) 5 % ointment, Apply to affected area as needed (Patient taking differently: Apply topically 3 (three) times a day as needed (apply to back and abdomen as  needed for pain). Apply to affected area as needed), Disp: 35.44 g, Rfl: 0     loratadine (CLARITIN) 10 mg tablet, Take 10 mg by mouth daily as needed for allergies. , Disp: , Rfl:      magnesium oxide (MAG-OX) 400 mg tablet, Take 400 mg by mouth daily with breakfast., Disp: , Rfl:      metoprolol tartrate (LOPRESSOR) 25 MG tablet, Take 25 mg by mouth 2 (two) times a day., Disp: , Rfl:      multivitamin therapeutic tablet, Take 1 tablet by mouth daily., Disp: , Rfl:      NEBULIZER ACCESSORIES (NEBULIZER MISC), Use as directed., Disp: , Rfl:      omeprazole (PRILOSEC) 20 MG capsule, TAKE 1 PILL (20 MG TOTAL) BY MOUTH EVERY DAY/TXXOCHITLA HNUB NOJ 1 LUB TSHUAJ PAB SERAFIN LUB PLAB, Disp: 30 capsule, Rfl: 11     potassium chloride (K-DUR,KLOR-CON) 20 MEQ tablet, Take 20 mEq by mouth 2 (two) times a day., Disp: , Rfl:      PULMO-AIDE COMPRESSOR Amy, , Disp: , Rfl:  "0     senna-docusate (PERICOLACE) 8.6-50 mg tablet, Take 2 tablets by mouth 2 (two) times a day as needed for constipation. , Disp: , Rfl:      tiotropium (SPIRIVA) 18 mcg inhalation capsule, Place 18 mcg into inhaler and inhale daily., Disp: , Rfl:     Family History   Problem Relation Age of Onset     Cancer Mother      Ovarian     No Medical Problems Father      Clotting disorder Neg Hx         reports that he quit smoking about 3 years ago. He has a 30.00 pack-year smoking history. He has never used smokeless tobacco. He reports that he does not drink alcohol or use illicit drugs.    Review of Systems:  10 system were reviewed and all are within normal limits except for those listed in the HPI.      EXAM:  /52  Pulse 73  Ht 5' 3\" (1.6 m)  Wt 148 lb (67.1 kg)  SpO2 96%  BMI 26.22 kg/m2  GENERAL: Well developed male, on O2,    EYES: Anicteric Sclera,  EOMI  CARDIAC: RRR w/out murmur  CHEST/LUNG: Clear to ascultation, No wheezes  ABDOMEN: Soft with, +Bowel Sounds, cholecystostomy tube.  NEURO:No focal deficits, ambulatory  LYMPH: No Axillary or inguinal Adenopathy  EXTREMITIES: Ambulatory, No lower extremity deformities      LABS:  Lab Results   Component Value Date    WBC 6.4 05/20/2018    WBC 9.1 06/22/2015    HGB 12.1 (L) 05/20/2018    HCT 38.5 (L) 05/20/2018    MCV 95 05/20/2018     05/20/2018     INR/Prothrombin Time      Lab Results   Component Value Date    ALT 17 05/20/2018    AST 32 05/20/2018    ALKPHOS 119 05/20/2018    BILITOT 0.6 05/20/2018       IMAGES:   US ABDOMEN LIMITED  5/17/2018 5:37 PM     INDICATION: History acalculous cholecystitis status post drain placement, patient with continuing abdominal pain.  COMPARISON: CT 5/16/2018 that showed a percutaneous cholecystostomy tube in place.     FINDINGS:  GALLBLADDER: There is a 4.2 x 2.7 x 3.4 cm fluid collection in the gallbladder fossa likely in the gallbladder with a surrounding thickened wall. There is an adjacent drain.  BILE " DUCTS: No bile duct dilation. The common bile duct measures 6 mm.  LIVER: There is echogenic which may indicate some fatty infiltration.  RIGHT KIDNEY: Right kidney was not well-visualized in this patient.  PANCREAS: Not imaged in detail on this limited study. No incidental abnormalities.     No ascites in the right upper quadrant.     IMPRESSION:   CONCLUSION:  1.  A 4.2 x 2.7 x 3.4 cm fluid collection in the gallbladder fossa likely is in the gallbladder; the surrounding wall appears thickened. There is an adjacent drain.     2.  The common bile duct is within the range of normal at 6 mm    Assessment/Plan: Pt with signs and symptoms consistent with cholecystitis which was treated with a C-Tube.   I have recommended a cholecystectomy. I discussed with him the plan to do this laparoscopically understanding the possibility of needing to convert to an open operation. I went over some of the risks of surgery including but not limited to bleeding, infection and bile duct injury. I also discussed the outpatient nature of the surgery and the expected recovery time.         Douglas Benito MD  [phone]  [facility] Department of Surgery

## 2021-06-18 NOTE — ANESTHESIA POSTPROCEDURE EVALUATION
Patient: Qing Lira  ENDOSCOPIC RETROGRADE CHOLANGIOPANCREATOGRAPHY  Anesthesia type: general    Patient location: PACU  Last vitals:   Vitals:    06/13/18 1230   BP: 122/74   Pulse: 96   Resp: 20   Temp: 36.6  C (97.9  F)   SpO2: 93%     Post vital signs: stable  Level of consciousness: awake and responds to simple questions  Post-anesthesia pain: pain controlled  Post-anesthesia nausea and vomiting: no  Pulmonary: unassisted, return to baseline  Cardiovascular: stable and blood pressure at baseline  Hydration: adequate  Anesthetic events: no    QCDR Measures:  ASA# 11 - Angelica-op Cardiac Arrest: ASA11B - Patient did NOT experience unanticipated cardiac arrest  ASA# 12 - Angelica-op Mortality Rate: ASA12B - Patient did NOT die  ASA# 13 - PACU Re-Intubation Rate: ASA13B - Patient did NOT require a new airway mgmt  ASA# 10 - Composite Anes Safety: ASA10A - No serious adverse event    Additional Notes:

## 2021-06-18 NOTE — ANESTHESIA POSTPROCEDURE EVALUATION
Patient: Qnig Lira  CHOLECYSTECTOMY, LAPAROSCOPIC  with removal of Cholecystostomy tube, CHOLANGIOGRAMS  Anesthesia type: general    Patient location: PACU  Last vitals:   Vitals:    06/12/18 0945   BP: 116/57   Pulse: 86   Resp: 21   Temp:    SpO2: 91%     Post vital signs: stable  Level of consciousness: awake and responds to simple questions  Post-anesthesia pain: pain controlled  Post-anesthesia nausea and vomiting: no  Pulmonary: unassisted, return to baseline  Cardiovascular: stable and blood pressure at baseline  Hydration: adequate  Anesthetic events: no    QCDR Measures:  ASA# 11 - Angelica-op Cardiac Arrest: ASA11B - Patient did NOT experience unanticipated cardiac arrest  ASA# 12 - Angelica-op Mortality Rate: ASA12B - Patient did NOT die  ASA# 13 - PACU Re-Intubation Rate: ASA13B - Patient did NOT require a new airway mgmt  ASA# 10 - Composite Anes Safety: ASA10A - No serious adverse event    Additional Notes:

## 2021-06-18 NOTE — ANESTHESIA PREPROCEDURE EVALUATION
Anesthesia Evaluation      Patient summary reviewed     Airway   Mallampati: III  Neck ROM: limited   Pulmonary    (+) pneumonia, COPD, asthma  decreased breath sounds, wheezes,                          Cardiovascular   (+) hypertension, ,     Rhythm: regular  Rate: abnormal,         Neuro/Psych    (+) CVA ,     Endo/Other       GI/Hepatic/Renal       Other findings:                COPD (chronic obstructive pulmonary disease) (H)     Compression fracture of thoracic spine, non-traumatic (H)    Acute on chronic respiratory failure (H)    Narcotic overdose, accidental or unintentional, subsequent encounter    Right ventricular systolic dysfunction    Adenomatous polyp of colon    Chronic adrenal insufficiency (H)    Osteoporosis    Steroid side effects, sequela    Altered mental status    Chronic cholecystitis    Empty sella syndrome (H)    Hypertension    Stroke (H)    Complex partial seizures (H)    Cholecystitis    Choledocholithiasis             Dental - normal exam   (+) poor dentition, upper dentures and lower dentures                       Anesthesia Plan  Planned anesthetic: general endotracheal  GETA - GLIDESCOPE  HYDROCOTISONE: 50 MG IV  LOW THRESHOLD FOR POSTOP DUONEB  MALINA/SUGAMMADEX  DEC 4 ZOF4  ASA 3     Anesthetic plan and risks discussed with: patient  Anesthesia plan special considerations: antiemetics,   Post-op plan: routine recovery

## 2021-06-18 NOTE — ANESTHESIA POSTPROCEDURE EVALUATION
Patient: Qing Lira  ENDOSCOPIC RETROGRADE CHOLANGIOPANCREATOGRAPHY  Anesthesia type: general    Patient location: PACU  Last vitals:   Vitals:    06/13/18 1430   BP: 110/68   Pulse: 95   Resp: 18   Temp: 36.6  C (97.9  F)   SpO2: 97%     Post vital signs: stable  Level of consciousness: awake and responds to simple questions  Post-anesthesia pain: pain controlled  Post-anesthesia nausea and vomiting: no  Pulmonary: unassisted, return to baseline  Cardiovascular: stable and blood pressure at baseline  Hydration: adequate  Anesthetic events: no    QCDR Measures:  ASA# 11 - Angelica-op Cardiac Arrest: ASA11B - Patient did NOT experience unanticipated cardiac arrest  ASA# 12 - Angelica-op Mortality Rate: ASA12B - Patient did NOT die  ASA# 13 - PACU Re-Intubation Rate: ASA13B - Patient did NOT require a new airway mgmt  ASA# 10 - Composite Anes Safety: ASA10A - No serious adverse event    Additional Notes:

## 2021-06-21 NOTE — ANESTHESIA PROCEDURE NOTES
Emergent Intubation  Date/Time: 11/18/2018 6:51 AM    Performing CRNA: Nabeel Jimenez CRNA  Indications: respiratory distress  Route: oral  Technique: direct  Laryngoscope size: Glidescope #4.  Tube size: 7.5 mm  Tube type: cuffed  Cuff inflated: yes  Level of Difficulty: 0ETT to lip: 22 cm  Tube secured with: ETT treviño  ETCO2 = Yes  Breath sounds: equal  SaO2 %: 100    Sign out given. CXR and sedation per primary care team.

## 2021-07-01 NOTE — PROGRESS NOTES
Neurosurgery consultation was requested by:  {Lubna Ruiz *  Pain location: back  Radicular Pain is present: across beltline  Lhermitte sign: denies  Motor complaints: slight weakness  Sensory complaints: denies     Gait and balance issues: balance  Bowel or bladder issues: denies      Duration of SX is: since 2013  The symptoms are worse with: weather changes, walking and standing  The symptoms are better with: siting or laying down  Injury: denies  Severity is: moderate  Patient has tried the following conservative measures: PT- not helpful   VALENTE score is:       88%    EVE Felix       no

## 2021-07-03 NOTE — ADDENDUM NOTE
Addendum Note by Domingo Peters MD at 4/14/2017 11:40 AM     Author: Domingo Peters MD Service: -- Author Type: Physician    Filed: 4/14/2017 11:40 AM Encounter Date: 4/14/2017 Status: Signed    : Domingo Peters MD (Physician)    Addended by: DOMINGO PETERS on: 4/14/2017 11:40 AM        Modules accepted: Orders

## 2021-07-14 PROBLEM — J96.01 ACUTE HYPOXEMIC RESPIRATORY FAILURE (H): Status: RESOLVED | Noted: 2017-02-01 | Resolved: 2017-02-27

## 2021-07-14 PROBLEM — I63.9 CVA (CEREBRAL VASCULAR ACCIDENT) (H): Status: RESOLVED | Noted: 2018-01-01 | Resolved: 2018-01-01

## 2021-08-03 PROBLEM — J69.0 ASPIRATION PNEUMONIA (H): Status: RESOLVED | Noted: 2018-01-01 | Resolved: 2018-01-01
